# Patient Record
Sex: FEMALE | Race: WHITE | NOT HISPANIC OR LATINO
[De-identification: names, ages, dates, MRNs, and addresses within clinical notes are randomized per-mention and may not be internally consistent; named-entity substitution may affect disease eponyms.]

---

## 2019-09-20 ENCOUNTER — TRANSCRIPTION ENCOUNTER (OUTPATIENT)
Age: 24
End: 2019-09-20

## 2021-02-16 ENCOUNTER — TRANSCRIPTION ENCOUNTER (OUTPATIENT)
Age: 26
End: 2021-02-16

## 2021-03-23 ENCOUNTER — TRANSCRIPTION ENCOUNTER (OUTPATIENT)
Age: 26
End: 2021-03-23

## 2021-05-13 ENCOUNTER — RESULT REVIEW (OUTPATIENT)
Age: 26
End: 2021-05-13

## 2021-06-14 ENCOUNTER — TRANSCRIPTION ENCOUNTER (OUTPATIENT)
Age: 26
End: 2021-06-14

## 2021-06-28 ENCOUNTER — TRANSCRIPTION ENCOUNTER (OUTPATIENT)
Age: 26
End: 2021-06-28

## 2021-07-07 ENCOUNTER — APPOINTMENT (OUTPATIENT)
Dept: ORTHOPEDIC SURGERY | Facility: CLINIC | Age: 26
End: 2021-07-07
Payer: COMMERCIAL

## 2021-07-07 ENCOUNTER — NON-APPOINTMENT (OUTPATIENT)
Age: 26
End: 2021-07-07

## 2021-07-07 VITALS
SYSTOLIC BLOOD PRESSURE: 119 MMHG | BODY MASS INDEX: 34.53 KG/M2 | DIASTOLIC BLOOD PRESSURE: 83 MMHG | HEART RATE: 74 BPM | WEIGHT: 220 LBS | HEIGHT: 67 IN

## 2021-07-07 DIAGNOSIS — M54.5 LOW BACK PAIN: ICD-10-CM

## 2021-07-07 DIAGNOSIS — G89.29 LOW BACK PAIN: ICD-10-CM

## 2021-07-07 DIAGNOSIS — S73.191A OTHER SPRAIN OF RIGHT HIP, INITIAL ENCOUNTER: ICD-10-CM

## 2021-07-07 PROCEDURE — 72170 X-RAY EXAM OF PELVIS: CPT

## 2021-07-07 PROCEDURE — 99203 OFFICE O/P NEW LOW 30 MIN: CPT

## 2021-07-07 PROCEDURE — 72100 X-RAY EXAM L-S SPINE 2/3 VWS: CPT

## 2021-07-07 PROCEDURE — 99072 ADDL SUPL MATRL&STAF TM PHE: CPT

## 2021-11-30 ENCOUNTER — TRANSCRIPTION ENCOUNTER (OUTPATIENT)
Age: 26
End: 2021-11-30

## 2022-04-26 ENCOUNTER — TRANSCRIPTION ENCOUNTER (OUTPATIENT)
Age: 27
End: 2022-04-26

## 2022-06-13 ENCOUNTER — APPOINTMENT (OUTPATIENT)
Dept: ORTHOPEDIC SURGERY | Facility: CLINIC | Age: 27
End: 2022-06-13
Payer: COMMERCIAL

## 2022-06-13 VITALS — WEIGHT: 220 LBS | HEIGHT: 67 IN | BODY MASS INDEX: 34.53 KG/M2

## 2022-06-13 DIAGNOSIS — M25.362 OTHER INSTABILITY, LEFT KNEE: ICD-10-CM

## 2022-06-13 DIAGNOSIS — S83.005A UNSPECIFIED DISLOCATION OF LEFT PATELLA, INITIAL ENCOUNTER: ICD-10-CM

## 2022-06-13 DIAGNOSIS — Z78.9 OTHER SPECIFIED HEALTH STATUS: ICD-10-CM

## 2022-06-13 PROCEDURE — 73564 X-RAY EXAM KNEE 4 OR MORE: CPT | Mod: LT

## 2022-06-13 PROCEDURE — 99204 OFFICE O/P NEW MOD 45 MIN: CPT

## 2022-06-13 NOTE — HISTORY OF PRESENT ILLNESS
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  felt patella dislocate.  pain located at the anteiror and medial patellar area with associated 'pop' at time of injury and sense of instability at patella .  worse with stairs and better at rest.  has tried the following: Advil and Tylenol and icing\par \par H/O labral repair left hip 5/3/2022 Dr. Toribio\par

## 2022-06-13 NOTE — IMAGING
[de-identified] :  LEFT KNEE\par Inspection:  moderate effusion\par Palpation: med facet of patella tenderness, medial retinacular tenderness\par Knee Range of Motion:  5-120\par Strength: 5/5 Quadriceps strength, 5/5 Hamstring strength\par Neurological: light touch is intact throughout\par Ligament Stability and Special Tests: \par McMurrays: neg\par Lachman: neg\par Pivot Shift: neg\par Posterior Drawer: neg\par Valgus: neg\par Varus: neg\par Patella Apprehension: positive\par Patella Maltracking: positive\par

## 2022-06-13 NOTE — ASSESSMENT
[FreeTextEntry1] :  Left X-Ray Examination of the KNEE (4 views): there are no fractures, subluxations or dislocations.\par \par Due to the patients instability event and mechanical symptoms along with pain, effusion, and pos patella apprehension and maltracking on exam we will get an mri to eval for loose bodies or chondral defects\par \par - The patient was advised to apply ice (wrapped in a towel or protective covering) to the area daily (20 minutes at a time, 2-4X/day).\par - PT for quad/VMO strengthening and progress to dynamic core and glut exercises\par - The patient was advised to modify their activities.\par - Patella stablization brace \par - f/u after mri\par \par \par Medication Discussion:\par 1) We discussed a comprehensive treatment plan that included possible pharmaceutical management involving the use of prescription strength medications including but not limited to options such as oral Naprosyn 500mg BID, once daily Meloxicam 15 mg, or 500-650 mg Tylenol versus over the counter oral medications in addition to discussing possible topical prescription Pennsaid vs  Voltaren gel.\par 2) There is a moderate risk of morbidity with further treatment, especially from use of prescription strength medications and possible side effects of these medications which include but are not limited to upset stomach with oral medications, skin reactions to topical medications and GI/cardiac/renal issues with long term use.\par 3) I recommended that the patient follow-up with their medical physician to discuss any significant specific potential issues with long term medication use such as interactions with current medications or with exacerbation of underlying medical comorbidities.\par 4) The benefits and risks associated with use of oral and / or topical prescription and over the counter anti-inflammatory medications were discussed with the patient. The patient voiced understanding of the risks including but not limited to bleeding, stroke, kidney dysfunction, heart disease, and were referred to the black box warning label for further information.\par \par

## 2022-06-18 ENCOUNTER — APPOINTMENT (OUTPATIENT)
Dept: MRI IMAGING | Facility: CLINIC | Age: 27
End: 2022-06-18

## 2022-06-23 ENCOUNTER — APPOINTMENT (OUTPATIENT)
Dept: ORTHOPEDIC SURGERY | Facility: CLINIC | Age: 27
End: 2022-06-23

## 2022-07-27 ENCOUNTER — NON-APPOINTMENT (OUTPATIENT)
Age: 27
End: 2022-07-27

## 2022-08-21 ENCOUNTER — NON-APPOINTMENT (OUTPATIENT)
Age: 27
End: 2022-08-21

## 2022-09-15 ENCOUNTER — NON-APPOINTMENT (OUTPATIENT)
Age: 27
End: 2022-09-15

## 2022-09-20 ENCOUNTER — APPOINTMENT (OUTPATIENT)
Dept: ORTHOPEDIC SURGERY | Facility: CLINIC | Age: 27
End: 2022-09-20

## 2022-09-28 ENCOUNTER — NON-APPOINTMENT (OUTPATIENT)
Age: 27
End: 2022-09-28

## 2023-01-03 ENCOUNTER — NON-APPOINTMENT (OUTPATIENT)
Age: 28
End: 2023-01-03

## 2023-01-23 ENCOUNTER — NON-APPOINTMENT (OUTPATIENT)
Age: 28
End: 2023-01-23

## 2023-05-02 ENCOUNTER — NON-APPOINTMENT (OUTPATIENT)
Age: 28
End: 2023-05-02

## 2023-07-13 ENCOUNTER — NON-APPOINTMENT (OUTPATIENT)
Age: 28
End: 2023-07-13

## 2023-07-23 ENCOUNTER — NON-APPOINTMENT (OUTPATIENT)
Age: 28
End: 2023-07-23

## 2023-10-31 ENCOUNTER — NON-APPOINTMENT (OUTPATIENT)
Age: 28
End: 2023-10-31

## 2023-11-20 ENCOUNTER — NON-APPOINTMENT (OUTPATIENT)
Age: 28
End: 2023-11-20

## 2024-01-22 ENCOUNTER — APPOINTMENT (OUTPATIENT)
Dept: ANTEPARTUM | Facility: CLINIC | Age: 29
End: 2024-01-22
Payer: COMMERCIAL

## 2024-01-22 ENCOUNTER — ASOB RESULT (OUTPATIENT)
Age: 29
End: 2024-01-22

## 2024-01-22 PROCEDURE — 76813 OB US NUCHAL MEAS 1 GEST: CPT

## 2024-01-22 PROCEDURE — 76801 OB US < 14 WKS SINGLE FETUS: CPT | Mod: 59

## 2024-02-09 ENCOUNTER — NON-APPOINTMENT (OUTPATIENT)
Age: 29
End: 2024-02-09

## 2024-03-18 ENCOUNTER — ASOB RESULT (OUTPATIENT)
Age: 29
End: 2024-03-18

## 2024-03-18 ENCOUNTER — APPOINTMENT (OUTPATIENT)
Dept: ANTEPARTUM | Facility: CLINIC | Age: 29
End: 2024-03-18
Payer: COMMERCIAL

## 2024-03-18 PROCEDURE — 76811 OB US DETAILED SNGL FETUS: CPT

## 2024-03-25 ENCOUNTER — APPOINTMENT (OUTPATIENT)
Dept: ANTEPARTUM | Facility: CLINIC | Age: 29
End: 2024-03-25

## 2024-03-25 ENCOUNTER — OUTPATIENT (OUTPATIENT)
Dept: OUTPATIENT SERVICES | Facility: HOSPITAL | Age: 29
LOS: 1 days | Discharge: HOME | End: 2024-03-25
Payer: COMMERCIAL

## 2024-03-25 VITALS
HEART RATE: 102 BPM | DIASTOLIC BLOOD PRESSURE: 74 MMHG | SYSTOLIC BLOOD PRESSURE: 126 MMHG | TEMPERATURE: 98 F | RESPIRATION RATE: 16 BRPM

## 2024-03-25 VITALS — SYSTOLIC BLOOD PRESSURE: 106 MMHG | HEART RATE: 85 BPM | DIASTOLIC BLOOD PRESSURE: 59 MMHG

## 2024-03-25 DIAGNOSIS — O26.899 OTHER SPECIFIED PREGNANCY RELATED CONDITIONS, UNSPECIFIED TRIMESTER: ICD-10-CM

## 2024-03-25 DIAGNOSIS — Z98.890 OTHER SPECIFIED POSTPROCEDURAL STATES: Chronic | ICD-10-CM

## 2024-03-25 LAB
ALBUMIN SERPL ELPH-MCNC: 3.7 G/DL — SIGNIFICANT CHANGE UP (ref 3.3–5)
ALP SERPL-CCNC: 79 U/L — SIGNIFICANT CHANGE UP (ref 40–120)
ALT FLD-CCNC: 22 U/L — SIGNIFICANT CHANGE UP (ref 4–33)
ANION GAP SERPL CALC-SCNC: 9 MMOL/L — SIGNIFICANT CHANGE UP (ref 7–14)
APPEARANCE UR: ABNORMAL
APTT BLD: 30.8 SEC — SIGNIFICANT CHANGE UP (ref 24.5–35.6)
AST SERPL-CCNC: 18 U/L — SIGNIFICANT CHANGE UP (ref 4–32)
BACTERIA # UR AUTO: ABNORMAL /HPF
BASOPHILS # BLD AUTO: 0.03 K/UL — SIGNIFICANT CHANGE UP (ref 0–0.2)
BASOPHILS NFR BLD AUTO: 0.3 % — SIGNIFICANT CHANGE UP (ref 0–2)
BILIRUB SERPL-MCNC: <0.2 MG/DL — SIGNIFICANT CHANGE UP (ref 0.2–1.2)
BILIRUB UR-MCNC: NEGATIVE — SIGNIFICANT CHANGE UP
BUN SERPL-MCNC: 6 MG/DL — LOW (ref 7–23)
CALCIUM SERPL-MCNC: 8.9 MG/DL — SIGNIFICANT CHANGE UP (ref 8.4–10.5)
CAST: 0 /LPF — SIGNIFICANT CHANGE UP (ref 0–4)
CHLORIDE SERPL-SCNC: 104 MMOL/L — SIGNIFICANT CHANGE UP (ref 98–107)
CO2 SERPL-SCNC: 24 MMOL/L — SIGNIFICANT CHANGE UP (ref 22–31)
COLOR SPEC: YELLOW — SIGNIFICANT CHANGE UP
CREAT ?TM UR-MCNC: 142 MG/DL — SIGNIFICANT CHANGE UP
CREAT SERPL-MCNC: 0.55 MG/DL — SIGNIFICANT CHANGE UP (ref 0.5–1.3)
DIFF PNL FLD: NEGATIVE — SIGNIFICANT CHANGE UP
EGFR: 127 ML/MIN/1.73M2 — SIGNIFICANT CHANGE UP
EOSINOPHIL # BLD AUTO: 0.16 K/UL — SIGNIFICANT CHANGE UP (ref 0–0.5)
EOSINOPHIL NFR BLD AUTO: 1.4 % — SIGNIFICANT CHANGE UP (ref 0–6)
FIBRINOGEN PPP-MCNC: 624 MG/DL — HIGH (ref 200–465)
GLUCOSE SERPL-MCNC: 82 MG/DL — SIGNIFICANT CHANGE UP (ref 70–99)
GLUCOSE UR QL: NEGATIVE MG/DL — SIGNIFICANT CHANGE UP
HCT VFR BLD CALC: 34.7 % — SIGNIFICANT CHANGE UP (ref 34.5–45)
HGB BLD-MCNC: 11.3 G/DL — LOW (ref 11.5–15.5)
IANC: 9.37 K/UL — HIGH (ref 1.8–7.4)
IMM GRANULOCYTES NFR BLD AUTO: 1 % — HIGH (ref 0–0.9)
INR BLD: <0.9 RATIO — SIGNIFICANT CHANGE UP (ref 0.85–1.18)
KETONES UR-MCNC: NEGATIVE MG/DL — SIGNIFICANT CHANGE UP
LDH SERPL L TO P-CCNC: 149 U/L — SIGNIFICANT CHANGE UP (ref 135–225)
LEUKOCYTE ESTERASE UR-ACNC: NEGATIVE — SIGNIFICANT CHANGE UP
LYMPHOCYTES # BLD AUTO: 1.27 K/UL — SIGNIFICANT CHANGE UP (ref 1–3.3)
LYMPHOCYTES # BLD AUTO: 11 % — LOW (ref 13–44)
MCHC RBC-ENTMCNC: 29.5 PG — SIGNIFICANT CHANGE UP (ref 27–34)
MCHC RBC-ENTMCNC: 32.6 GM/DL — SIGNIFICANT CHANGE UP (ref 32–36)
MCV RBC AUTO: 90.6 FL — SIGNIFICANT CHANGE UP (ref 80–100)
MONOCYTES # BLD AUTO: 0.61 K/UL — SIGNIFICANT CHANGE UP (ref 0–0.9)
MONOCYTES NFR BLD AUTO: 5.3 % — SIGNIFICANT CHANGE UP (ref 2–14)
NEUTROPHILS # BLD AUTO: 9.37 K/UL — HIGH (ref 1.8–7.4)
NEUTROPHILS NFR BLD AUTO: 81 % — HIGH (ref 43–77)
NITRITE UR-MCNC: NEGATIVE — SIGNIFICANT CHANGE UP
NRBC # BLD: 0 /100 WBCS — SIGNIFICANT CHANGE UP (ref 0–0)
NRBC # FLD: 0 K/UL — SIGNIFICANT CHANGE UP (ref 0–0)
PH UR: 7 — SIGNIFICANT CHANGE UP (ref 5–8)
PLATELET # BLD AUTO: 326 K/UL — SIGNIFICANT CHANGE UP (ref 150–400)
POTASSIUM SERPL-MCNC: 3.9 MMOL/L — SIGNIFICANT CHANGE UP (ref 3.5–5.3)
POTASSIUM SERPL-SCNC: 3.9 MMOL/L — SIGNIFICANT CHANGE UP (ref 3.5–5.3)
PROT ?TM UR-MCNC: 14 MG/DL — SIGNIFICANT CHANGE UP
PROT SERPL-MCNC: 6.3 G/DL — SIGNIFICANT CHANGE UP (ref 6–8.3)
PROT UR-MCNC: NEGATIVE MG/DL — SIGNIFICANT CHANGE UP
PROT/CREAT UR-RTO: 0.1 RATIO — SIGNIFICANT CHANGE UP (ref 0–0.2)
PROTHROM AB SERPL-ACNC: 9.8 SEC — SIGNIFICANT CHANGE UP (ref 9.5–13)
RBC # BLD: 3.83 M/UL — SIGNIFICANT CHANGE UP (ref 3.8–5.2)
RBC # FLD: 14.9 % — HIGH (ref 10.3–14.5)
RBC CASTS # UR COMP ASSIST: 2 /HPF — SIGNIFICANT CHANGE UP (ref 0–4)
SODIUM SERPL-SCNC: 137 MMOL/L — SIGNIFICANT CHANGE UP (ref 135–145)
SP GR SPEC: 1.02 — SIGNIFICANT CHANGE UP (ref 1–1.03)
SQUAMOUS # UR AUTO: 6 /HPF — HIGH (ref 0–5)
URATE SERPL-MCNC: 3.4 MG/DL — SIGNIFICANT CHANGE UP (ref 2.5–7)
UROBILINOGEN FLD QL: 0.2 MG/DL — SIGNIFICANT CHANGE UP (ref 0.2–1)
WBC # BLD: 11.55 K/UL — HIGH (ref 3.8–10.5)
WBC # FLD AUTO: 11.55 K/UL — HIGH (ref 3.8–10.5)
WBC UR QL: 6 /HPF — HIGH (ref 0–5)

## 2024-03-25 PROCEDURE — 99212 OFFICE O/P EST SF 10 MIN: CPT

## 2024-03-25 NOTE — OB PROVIDER TRIAGE NOTE - NSOBPROVIDERNOTE_OBGYN_ALL_OB_FT
Dr Ash patient is a 29y/o EDC 2024  EGA 21   encounter for elevated bp.  - FHR check  -Hellp labs  - limited ultrasound Dr Ash patient is a 29y/o EDC 2024  EGA 21   encounter for elevated bp.  - FHR check  -Hellp labs  - limited ultrasound    1338  - HELLP labs negative, PC ratio 0.1.    1345    Patient states she feels better but still the headache, refuses tylenol at this time.  - Discussed with Dr Ash  - Patient encouraged to take tylenol if headache persist.  - Preeclampsia precautions given.  - Pt to f/u in one week with Dr Ash.  -All questions and concerns addressed. Pt verbalized understanding.

## 2024-03-25 NOTE — OB PROVIDER TRIAGE NOTE - HISTORY OF PRESENT ILLNESS
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 Patient c/o also seeing floaters yesterday. Patient at time of evaluation headache 2/10 on numerical scale which is temporal and mild. Patient denies nausea, vomiting, scotoma, pain under the right breast and epigastric pain. Patient reports fetal movement. Patient denies loss of fluid, vaginal bleeding and cramp and/or contractions.    Antepartum History:  -[facility] ultrasound (date): cephalic presentation, anterior placenta, HARIS: largest pocket 4.4  -ASA daily use

## 2024-03-25 NOTE — OB PROVIDER TRIAGE NOTE - NSHPLABSRESULTS_GEN_ALL_CORE
11.3   11.55 )-----------( 326      ( 25 Mar 2024 12:42 )             34.7   Urinalysis Basic - ( 25 Mar 2024 12:58 )    Color: Yellow / Appearance: Cloudy / S.018 / pH: x  Gluc: x / Ketone: Negative mg/dL  / Bili: Negative / Urobili: 0.2 mg/dL   Blood: x / Protein: Negative mg/dL / Nitrite: Negative   Leuk Esterase: Negative / RBC: 2 /HPF / WBC 6 /HPF   Sq Epi: x / Non Sq Epi: 6 /HPF / Bacteria: Few /HPF    PC ratio 0.1  Fibrinogen: 624

## 2024-03-25 NOTE — OB PROVIDER TRIAGE NOTE - NSHPPHYSICALEXAM_GEN_ALL_CORE
Neuro: No facial asymmetry, no slurred speech, moves all 4 extremities.  Mood: Alert and oriented x4, appropriate mood and affect.  Head: Normocephalic. Atraumatic.  Eyes: No discharge, lids and conjunctiva are normal.  Lungs: No respiratory distress, lung sounds are clear bilaterally.  Abdomen: Soft, nontender. Gravid. No contractions on palpation.  Lower extremities: DTR +2 bilaterally.  TAUS: presentation, placenta, HARIS, EFW, BPP. Sono saved in ASOB.  FHR: via ultrasound Neuro: No facial asymmetry, no slurred speech, moves all 4 extremities.  Mood: Alert and oriented x4, appropriate mood and affect.  Head: Normocephalic. Atraumatic.  Eyes: No discharge, lids and conjunctiva are normal.  Lungs: No respiratory distress, lung sounds are clear bilaterally.  Abdomen: Soft, nontender. Gravid. No contractions on palpation.  Lower extremities: DTR +2 bilaterally.  TAUS: anterior placenta, MVP- 4.3. Sono saved in ASOB.  FHR: 138 BPM via ultrasound

## 2024-03-25 NOTE — OB PROVIDER TRIAGE NOTE - ADDITIONAL INSTRUCTIONS
Patient states she feels better but still has a headache, refuses tylenol at this time.  - Discussed with Dr Ash  - Patient encouraged to take tylenol if headache persist.  - Preeclampsia precautions given.  - Pt to f/u in one week with Dr Ash.  -All questions and concerns addressed. Pt verbalized understanding.

## 2024-03-27 DIAGNOSIS — O26.892 OTHER SPECIFIED PREGNANCY RELATED CONDITIONS, SECOND TRIMESTER: ICD-10-CM

## 2024-03-27 DIAGNOSIS — R03.0 ELEVATED BLOOD-PRESSURE READING, WITHOUT DIAGNOSIS OF HYPERTENSION: ICD-10-CM

## 2024-03-27 DIAGNOSIS — Z3A.21 21 WEEKS GESTATION OF PREGNANCY: ICD-10-CM

## 2024-04-29 ENCOUNTER — NON-APPOINTMENT (OUTPATIENT)
Age: 29
End: 2024-04-29

## 2024-04-30 ENCOUNTER — NON-APPOINTMENT (OUTPATIENT)
Age: 29
End: 2024-04-30

## 2024-05-06 ENCOUNTER — ASOB RESULT (OUTPATIENT)
Age: 29
End: 2024-05-06

## 2024-05-06 ENCOUNTER — APPOINTMENT (OUTPATIENT)
Dept: MATERNAL FETAL MEDICINE | Facility: CLINIC | Age: 29
End: 2024-05-06
Payer: COMMERCIAL

## 2024-05-06 DIAGNOSIS — O24.419 GESTATIONAL DIABETES MELLITUS IN PREGNANCY, UNSPECIFIED CONTROL: ICD-10-CM

## 2024-05-06 PROCEDURE — G0108 DIAB MANAGE TRN  PER INDIV: CPT | Mod: 95

## 2024-05-06 RX ORDER — LANCETS 33 GAUGE
EACH MISCELLANEOUS
Qty: 4 | Refills: 0 | Status: ACTIVE | COMMUNITY
Start: 2024-05-06 | End: 1900-01-01

## 2024-05-06 RX ORDER — BLOOD-GLUCOSE METER
W/DEVICE KIT MISCELLANEOUS
Qty: 1 | Refills: 0 | Status: ACTIVE | COMMUNITY
Start: 2024-05-06 | End: 1900-01-01

## 2024-05-06 RX ORDER — URINE ACETONE TEST STRIPS
STRIP MISCELLANEOUS
Qty: 1 | Refills: 0 | Status: ACTIVE | COMMUNITY
Start: 2024-05-06 | End: 1900-01-01

## 2024-05-06 RX ORDER — BLOOD-GLUCOSE METER
KIT MISCELLANEOUS 4 TIMES DAILY
Qty: 4 | Refills: 2 | Status: ACTIVE | COMMUNITY
Start: 2024-05-06 | End: 1900-01-01

## 2024-05-13 ENCOUNTER — APPOINTMENT (OUTPATIENT)
Dept: ANTEPARTUM | Facility: CLINIC | Age: 29
End: 2024-05-13

## 2024-05-13 ENCOUNTER — ASOB RESULT (OUTPATIENT)
Age: 29
End: 2024-05-13

## 2024-05-13 ENCOUNTER — APPOINTMENT (OUTPATIENT)
Dept: ANTEPARTUM | Facility: CLINIC | Age: 29
End: 2024-05-13
Payer: COMMERCIAL

## 2024-05-13 PROCEDURE — 99213 OFFICE O/P EST LOW 20 MIN: CPT | Mod: 25

## 2024-05-13 PROCEDURE — 76816 OB US FOLLOW-UP PER FETUS: CPT

## 2024-05-13 PROCEDURE — 76819 FETAL BIOPHYS PROFIL W/O NST: CPT | Mod: 59

## 2024-05-16 ENCOUNTER — APPOINTMENT (OUTPATIENT)
Dept: MATERNAL FETAL MEDICINE | Facility: CLINIC | Age: 29
End: 2024-05-16
Payer: COMMERCIAL

## 2024-05-16 ENCOUNTER — ASOB RESULT (OUTPATIENT)
Age: 29
End: 2024-05-16

## 2024-05-16 DIAGNOSIS — O24.419 GESTATIONAL DIABETES MELLITUS IN PREGNANCY, UNSPECIFIED CONTROL: ICD-10-CM

## 2024-05-16 PROCEDURE — G0108 DIAB MANAGE TRN  PER INDIV: CPT | Mod: 95

## 2024-05-16 RX ORDER — PEN NEEDLE, DIABETIC 32GX 5/32"
32G X 4 MM NEEDLE, DISPOSABLE MISCELLANEOUS
Qty: 2 | Refills: 1 | Status: ACTIVE | COMMUNITY
Start: 2024-05-16 | End: 1900-01-01

## 2024-05-16 RX ORDER — ISOPROPYL ALCOHOL 0.7 ML/ML
SWAB TOPICAL
Qty: 1 | Refills: 2 | Status: ACTIVE | COMMUNITY
Start: 2024-05-16 | End: 1900-01-01

## 2024-05-16 RX ORDER — INSULIN HUMAN 100 [IU]/ML
100 INJECTION, SUSPENSION SUBCUTANEOUS
Qty: 1 | Refills: 1 | Status: ACTIVE | COMMUNITY
Start: 2024-05-16 | End: 1900-01-01

## 2024-05-23 ENCOUNTER — ASOB RESULT (OUTPATIENT)
Age: 29
End: 2024-05-23

## 2024-05-23 ENCOUNTER — APPOINTMENT (OUTPATIENT)
Dept: MATERNAL FETAL MEDICINE | Facility: CLINIC | Age: 29
End: 2024-05-23
Payer: COMMERCIAL

## 2024-05-23 PROCEDURE — G0108 DIAB MANAGE TRN  PER INDIV: CPT | Mod: 95

## 2024-05-23 RX ORDER — BLOOD-GLUCOSE SENSOR
EACH MISCELLANEOUS
Qty: 3 | Refills: 3 | Status: ACTIVE | COMMUNITY
Start: 2024-05-06 | End: 1900-01-01

## 2024-05-23 RX ORDER — BLOOD-GLUCOSE,RECEIVER,CONT
EACH MISCELLANEOUS
Qty: 1 | Refills: 0 | Status: ACTIVE | COMMUNITY
Start: 2024-05-06 | End: 1900-01-01

## 2024-05-30 ENCOUNTER — APPOINTMENT (OUTPATIENT)
Dept: MATERNAL FETAL MEDICINE | Facility: CLINIC | Age: 29
End: 2024-05-30

## 2024-06-06 ENCOUNTER — ASOB RESULT (OUTPATIENT)
Age: 29
End: 2024-06-06

## 2024-06-06 ENCOUNTER — APPOINTMENT (OUTPATIENT)
Dept: MATERNAL FETAL MEDICINE | Facility: CLINIC | Age: 29
End: 2024-06-06
Payer: COMMERCIAL

## 2024-06-06 PROCEDURE — G0108 DIAB MANAGE TRN  PER INDIV: CPT | Mod: 95

## 2024-06-08 ENCOUNTER — NON-APPOINTMENT (OUTPATIENT)
Age: 29
End: 2024-06-08

## 2024-06-10 ENCOUNTER — APPOINTMENT (OUTPATIENT)
Dept: ANTEPARTUM | Facility: CLINIC | Age: 29
End: 2024-06-10
Payer: COMMERCIAL

## 2024-06-10 ENCOUNTER — ASOB RESULT (OUTPATIENT)
Age: 29
End: 2024-06-10

## 2024-06-10 PROCEDURE — 76816 OB US FOLLOW-UP PER FETUS: CPT

## 2024-06-10 PROCEDURE — 76819 FETAL BIOPHYS PROFIL W/O NST: CPT | Mod: 59

## 2024-06-11 PROBLEM — Z78.9 OTHER SPECIFIED HEALTH STATUS: Chronic | Status: ACTIVE | Noted: 2024-03-25

## 2024-06-25 ENCOUNTER — OUTPATIENT (OUTPATIENT)
Dept: INPATIENT UNIT | Facility: HOSPITAL | Age: 29
LOS: 1 days | Discharge: HOME | End: 2024-06-25

## 2024-06-25 ENCOUNTER — APPOINTMENT (OUTPATIENT)
Dept: ANTEPARTUM | Facility: CLINIC | Age: 29
End: 2024-06-25

## 2024-06-25 VITALS
TEMPERATURE: 98 F | SYSTOLIC BLOOD PRESSURE: 118 MMHG | RESPIRATION RATE: 16 BRPM | HEART RATE: 84 BPM | DIASTOLIC BLOOD PRESSURE: 69 MMHG

## 2024-06-25 DIAGNOSIS — Z98.890 OTHER SPECIFIED POSTPROCEDURAL STATES: Chronic | ICD-10-CM

## 2024-06-25 DIAGNOSIS — O26.899 OTHER SPECIFIED PREGNANCY RELATED CONDITIONS, UNSPECIFIED TRIMESTER: ICD-10-CM

## 2024-06-25 LAB — GLUCOSE BLDC GLUCOMTR-MCNC: 74 MG/DL — SIGNIFICANT CHANGE UP (ref 70–99)

## 2024-06-25 RX ORDER — DEXTROSE MONOHYDRATE AND SODIUM CHLORIDE 5; .3 G/100ML; G/100ML
1000 INJECTION, SOLUTION INTRAVENOUS
Refills: 0 | Status: DISCONTINUED | OUTPATIENT
Start: 2024-06-25 | End: 2024-07-10

## 2024-06-25 RX ORDER — DEXTROSE MONOHYDRATE AND SODIUM CHLORIDE 5; .3 G/100ML; G/100ML
1000 INJECTION, SOLUTION INTRAVENOUS ONCE
Refills: 0 | Status: COMPLETED | OUTPATIENT
Start: 2024-06-25 | End: 2024-06-25

## 2024-06-25 RX ADMIN — DEXTROSE MONOHYDRATE AND SODIUM CHLORIDE 1000 MILLILITER(S): 5; .3 INJECTION, SOLUTION INTRAVENOUS at 23:14

## 2024-06-26 VITALS
RESPIRATION RATE: 16 BRPM | DIASTOLIC BLOOD PRESSURE: 70 MMHG | HEART RATE: 81 BPM | SYSTOLIC BLOOD PRESSURE: 126 MMHG | TEMPERATURE: 98 F

## 2024-06-26 LAB
APPEARANCE UR: CLEAR — SIGNIFICANT CHANGE UP
BILIRUB UR-MCNC: NEGATIVE — SIGNIFICANT CHANGE UP
COLOR SPEC: YELLOW — SIGNIFICANT CHANGE UP
DIFF PNL FLD: NEGATIVE — SIGNIFICANT CHANGE UP
GLUCOSE UR QL: NEGATIVE MG/DL — SIGNIFICANT CHANGE UP
KETONES UR-MCNC: 40 MG/DL
LEUKOCYTE ESTERASE UR-ACNC: NEGATIVE — SIGNIFICANT CHANGE UP
NITRITE UR-MCNC: NEGATIVE — SIGNIFICANT CHANGE UP
PH UR: 7 — SIGNIFICANT CHANGE UP (ref 5–8)
PROT UR-MCNC: NEGATIVE MG/DL — SIGNIFICANT CHANGE UP
SP GR SPEC: 1.01 — SIGNIFICANT CHANGE UP (ref 1–1.03)
UROBILINOGEN FLD QL: 0.2 MG/DL — SIGNIFICANT CHANGE UP (ref 0.2–1)

## 2024-06-27 ENCOUNTER — APPOINTMENT (OUTPATIENT)
Dept: MATERNAL FETAL MEDICINE | Facility: CLINIC | Age: 29
End: 2024-06-27
Payer: COMMERCIAL

## 2024-06-27 ENCOUNTER — ASOB RESULT (OUTPATIENT)
Age: 29
End: 2024-06-27

## 2024-06-27 DIAGNOSIS — O24.415 GESTATIONAL DIABETES MELLITUS IN PREGNANCY, CONTROLLED BY ORAL HYPOGLYCEMIC DRUGS: ICD-10-CM

## 2024-06-27 DIAGNOSIS — Z3A.35 35 WEEKS GESTATION OF PREGNANCY: ICD-10-CM

## 2024-06-27 DIAGNOSIS — O47.03 FALSE LABOR BEFORE 37 COMPLETED WEEKS OF GESTATION, THIRD TRIMESTER: ICD-10-CM

## 2024-06-27 PROBLEM — Z78.9 OTHER SPECIFIED HEALTH STATUS: Chronic | Status: INACTIVE | Noted: 2024-03-25 | Resolved: 2024-06-25

## 2024-06-27 PROCEDURE — G0108 DIAB MANAGE TRN  PER INDIV: CPT | Mod: 95

## 2024-07-01 ENCOUNTER — NON-APPOINTMENT (OUTPATIENT)
Age: 29
End: 2024-07-01

## 2024-07-03 ENCOUNTER — APPOINTMENT (OUTPATIENT)
Dept: MATERNAL FETAL MEDICINE | Facility: CLINIC | Age: 29
End: 2024-07-03

## 2024-07-08 ENCOUNTER — APPOINTMENT (OUTPATIENT)
Dept: ANTEPARTUM | Facility: CLINIC | Age: 29
End: 2024-07-08

## 2024-07-08 ENCOUNTER — ASOB RESULT (OUTPATIENT)
Age: 29
End: 2024-07-08

## 2024-07-08 ENCOUNTER — APPOINTMENT (OUTPATIENT)
Dept: ANTEPARTUM | Facility: CLINIC | Age: 29
End: 2024-07-08
Payer: COMMERCIAL

## 2024-07-08 PROBLEM — O24.419 GESTATIONAL DIABETES MELLITUS IN PREGNANCY, UNSPECIFIED CONTROL: Chronic | Status: ACTIVE | Noted: 2024-06-25

## 2024-07-08 PROCEDURE — 76818 FETAL BIOPHYS PROFILE W/NST: CPT | Mod: 59

## 2024-07-08 PROCEDURE — 76816 OB US FOLLOW-UP PER FETUS: CPT

## 2024-07-15 ENCOUNTER — APPOINTMENT (OUTPATIENT)
Dept: ANTEPARTUM | Facility: CLINIC | Age: 29
End: 2024-07-15
Payer: COMMERCIAL

## 2024-07-15 ENCOUNTER — ASOB RESULT (OUTPATIENT)
Age: 29
End: 2024-07-15

## 2024-07-15 PROCEDURE — 76818 FETAL BIOPHYS PROFILE W/NST: CPT

## 2024-07-22 ENCOUNTER — ASOB RESULT (OUTPATIENT)
Age: 29
End: 2024-07-22

## 2024-07-22 ENCOUNTER — APPOINTMENT (OUTPATIENT)
Dept: ANTEPARTUM | Facility: CLINIC | Age: 29
End: 2024-07-22
Payer: COMMERCIAL

## 2024-07-22 PROCEDURE — 76819 FETAL BIOPHYS PROFIL W/O NST: CPT

## 2024-07-28 ENCOUNTER — INPATIENT (INPATIENT)
Facility: HOSPITAL | Age: 29
LOS: 4 days | Discharge: HOME | End: 2024-08-02
Attending: STUDENT IN AN ORGANIZED HEALTH CARE EDUCATION/TRAINING PROGRAM | Admitting: STUDENT IN AN ORGANIZED HEALTH CARE EDUCATION/TRAINING PROGRAM
Payer: COMMERCIAL

## 2024-07-28 VITALS
DIASTOLIC BLOOD PRESSURE: 78 MMHG | TEMPERATURE: 98 F | SYSTOLIC BLOOD PRESSURE: 126 MMHG | RESPIRATION RATE: 18 BRPM | HEART RATE: 86 BPM

## 2024-07-28 DIAGNOSIS — O24.419 GESTATIONAL DIABETES MELLITUS IN PREGNANCY, UNSPECIFIED CONTROL: ICD-10-CM

## 2024-07-28 DIAGNOSIS — Z98.890 OTHER SPECIFIED POSTPROCEDURAL STATES: Chronic | ICD-10-CM

## 2024-07-28 LAB
BASOPHILS # BLD AUTO: 0.02 K/UL — SIGNIFICANT CHANGE UP (ref 0–0.2)
BASOPHILS NFR BLD AUTO: 0.2 % — SIGNIFICANT CHANGE UP (ref 0–2)
BLD GP AB SCN SERPL QL: POSITIVE — SIGNIFICANT CHANGE UP
EOSINOPHIL # BLD AUTO: 0.04 K/UL — SIGNIFICANT CHANGE UP (ref 0–0.5)
EOSINOPHIL NFR BLD AUTO: 0.4 % — SIGNIFICANT CHANGE UP (ref 0–6)
GLUCOSE BLDC GLUCOMTR-MCNC: 85 MG/DL — SIGNIFICANT CHANGE UP (ref 70–99)
HCT VFR BLD CALC: 36.5 % — SIGNIFICANT CHANGE UP (ref 34.5–45)
HGB BLD-MCNC: 12.6 G/DL — SIGNIFICANT CHANGE UP (ref 11.5–15.5)
IANC: 7.68 K/UL — HIGH (ref 1.8–7.4)
IMM GRANULOCYTES NFR BLD AUTO: 0.4 % — SIGNIFICANT CHANGE UP (ref 0–0.9)
LYMPHOCYTES # BLD AUTO: 1.21 K/UL — SIGNIFICANT CHANGE UP (ref 1–3.3)
LYMPHOCYTES # BLD AUTO: 12.6 % — LOW (ref 13–44)
MCHC RBC-ENTMCNC: 30.3 PG — SIGNIFICANT CHANGE UP (ref 27–34)
MCHC RBC-ENTMCNC: 34.5 GM/DL — SIGNIFICANT CHANGE UP (ref 32–36)
MCV RBC AUTO: 87.7 FL — SIGNIFICANT CHANGE UP (ref 80–100)
MONOCYTES # BLD AUTO: 0.58 K/UL — SIGNIFICANT CHANGE UP (ref 0–0.9)
MONOCYTES NFR BLD AUTO: 6.1 % — SIGNIFICANT CHANGE UP (ref 2–14)
NEUTROPHILS # BLD AUTO: 7.68 K/UL — HIGH (ref 1.8–7.4)
NEUTROPHILS NFR BLD AUTO: 80.3 % — HIGH (ref 43–77)
NRBC # BLD: 0 /100 WBCS — SIGNIFICANT CHANGE UP (ref 0–0)
NRBC # FLD: 0 K/UL — SIGNIFICANT CHANGE UP (ref 0–0)
PLATELET # BLD AUTO: 216 K/UL — SIGNIFICANT CHANGE UP (ref 150–400)
RBC # BLD: 4.16 M/UL — SIGNIFICANT CHANGE UP (ref 3.8–5.2)
RBC # FLD: 14.9 % — HIGH (ref 10.3–14.5)
RH IG SCN BLD-IMP: NEGATIVE — SIGNIFICANT CHANGE UP
RH IG SCN BLD-IMP: NEGATIVE — SIGNIFICANT CHANGE UP
WBC # BLD: 9.57 K/UL — SIGNIFICANT CHANGE UP (ref 3.8–10.5)
WBC # FLD AUTO: 9.57 K/UL — SIGNIFICANT CHANGE UP (ref 3.8–10.5)

## 2024-07-28 PROCEDURE — 86077 PHYS BLOOD BANK SERV XMATCH: CPT

## 2024-07-28 RX ORDER — BACTERIOSTATIC SODIUM CHLORIDE 0.9 %
1000 VIAL (ML) INJECTION
Refills: 0 | Status: DISCONTINUED | OUTPATIENT
Start: 2024-07-28 | End: 2024-07-30

## 2024-07-28 RX ORDER — TRISODIUM CITRATE DIHYDRATE AND CITRIC ACID MONOHYDRATE 500; 334 MG/5ML; MG/5ML
15 SOLUTION ORAL EVERY 6 HOURS
Refills: 0 | Status: DISCONTINUED | OUTPATIENT
Start: 2024-07-28 | End: 2024-07-30

## 2024-07-28 RX ORDER — DEXTROSE MONOHYDRATE, SODIUM CHLORIDE, SODIUM LACTATE, CALCIUM CHLORIDE, MAGNESIUM CHLORIDE 1.5; 538; 448; 18.4; 5.08 G/100ML; MG/100ML; MG/100ML; MG/100ML; MG/100ML
1000 SOLUTION INTRAPERITONEAL
Refills: 0 | Status: DISCONTINUED | OUTPATIENT
Start: 2024-07-28 | End: 2024-07-30

## 2024-07-28 RX ORDER — CHLORHEXIDINE GLUCONATE 500 MG/1
1 CLOTH TOPICAL DAILY
Refills: 0 | Status: DISCONTINUED | OUTPATIENT
Start: 2024-07-28 | End: 2024-07-30

## 2024-07-28 RX ADMIN — CHLORHEXIDINE GLUCONATE 1 APPLICATION(S): 500 CLOTH TOPICAL at 22:09

## 2024-07-28 RX ADMIN — DEXTROSE MONOHYDRATE, SODIUM CHLORIDE, SODIUM LACTATE, CALCIUM CHLORIDE, MAGNESIUM CHLORIDE 125 MILLILITER(S): 1.5; 538; 448; 18.4; 5.08 SOLUTION INTRAPERITONEAL at 22:09

## 2024-07-28 NOTE — OB RN PATIENT PROFILE - FALL HARM RISK - UNIVERSAL INTERVENTIONS
Bed in lowest position, wheels locked, appropriate side rails in place/Call bell, personal items and telephone in reach/Instruct patient to call for assistance before getting out of bed or chair/Non-slip footwear when patient is out of bed/[place] to call system/Physically safe environment - no spills, clutter or unnecessary equipment/Purposeful Proactive Rounding/Room/bathroom lighting operational, light cord in reach

## 2024-07-28 NOTE — OB PROVIDER H&P - NSHPPHYSICALEXAM_GEN_ALL_CORE
Vital Signs Last 24 Hrs  T(C): --  T(F): --  HR: --  BP: --  BP(mean): --  RR: --  SpO2: --        Physical Exam:  Gen: NAD, AxOx3  CV: RRR  Resp: CTAB  Abd: soft, NT, gravid      SVE:  FHT:  [place]:  EFW: 3700g  Sono: fetal presentation, placentation VS  T(C): --  HR: 86 (07-28-24 @ 21:16)  BP: 126/78 (07-28-24 @ 21:16)  RR: --  SpO2: --        Physical Exam:  Gen: NAD, AxOx3  CV: RRR  Resp: CTAB  Abd: soft, NT, gravid      SVE: 2/70/-3  FHT: Category 1  [place]: Irregular   EFW: 3700g  Sono: Cephalic

## 2024-07-28 NOTE — OB PROVIDER H&P - NSCORESITESY/N_GEN_A_CORE_RD
No Detail Level: Detailed Lesion Type: Abscess Method: comedo extractor Curette: No Size Of Lesion In Cm (Optional But May Be Required For Some Insurances): 0 Wound Care: Petrolatum Dressing: dry sterile dressing Epidermal Sutures: 4-0 Ethilon Epidermal Closure: simple interrupted Suture Text: The incision was partially closed with Preparation Text: The area was prepped in the usual clean fashion. Curette Text (Optional): After the contents were expressed a curette was used to partially remove the cyst wall. Consent was obtained and risks were reviewed including but not limited to delayed wound healing, infection, need for multiple I and D's, and pain. Post-Care Instructions: I reviewed with the patient in detail post-care instructions. Patient should keep wound covered and call the office should any redness, pain, swelling or worsening occur.

## 2024-07-28 NOTE — OB PROVIDER H&P - NSLOWPPHRISK_OBGYN_A_OB
No previous uterine incision/Reese Pregnancy/Less than or equal to 4 previous vaginal births/No known bleeding disorder/No history of postpartum hemorrhage/No other PPH risks indicated

## 2024-07-28 NOTE — OB PROVIDER H&P - NSSCHADMISSION_OBGYN_A_OB
----- Message from Rajni Telles RN sent at 7/19/2019  1:14 PM CDT -----  Urine culture showing E Coli, patient on Cipro which E Coli is sensitive to.   Yes

## 2024-07-28 NOTE — OB PROVIDER IHI INDUCTION/AUGMENTATION NOTE - NS_DILATION_OBGYN_ALL_OB_NU
Letter completed and given to Lorena to mail. Lorena please call Carol and let her know since she was exposed and has mild symptoms she needs to complete the 14 day quarantine at this time. If symptoms worsen and she begins having difficulty breathing then she needs to go to the ED. Otherwise, she needs to stay home.    Thanks  MB   2

## 2024-07-28 NOTE — OB PROVIDER H&P - ASSESSMENT
Pt is a 30 yo  @39.5wks, presenting as a scheduled induction of labor secondary to GDM A2. NPH 20 units a qHS.          A/P: Pt is a 30 yo  @39.5wks, presenting as a scheduled induction of labor secondary to GDM A2. NPH 20 units a qHS.    1. Admit to LND. Routine Labs. IVF  2. IOL  3. Fetus: Cat 1 tracing, Vertex, EFW 3700g . C/w EFM.  4. A2- Rotating fluids, FS q4 hrs  5. GBS negative   6. Pain: IV pain meds/epidural PRN      DEMETRI Chong  Discussed with Dr. Levin        Pt is a 28 yo  @39.5wks, presenting as a scheduled induction of labor secondary to GDM A2. NPH 20 units a qHS.          A/P: Pt is a 28 yo  @39.5wks, presenting as a scheduled induction of labor secondary to GDM A2. NPH 20 units a qHS.    1. Admit to LND. Routine Labs. IVF  2. IOL with buccal cytotec   3. Fetus: Cat 1 tracing, Vertex, EFW 3700g . C/w EFM.  4. A2- Rotating fluids, FS q4 hrs  5. GBS negative   6. Pain: IV pain meds/epidural PRN      Arlette NP  Discussed with Dr. Levin            A/P: Pt is a 28 yo  @39.5wks, presenting as a scheduled induction of labor secondary to GDM A2. NPH 20 units a qHS.    1. Admit to LND. Routine Labs. IVF  2. IOL with buccal cytotec   3. Fetus: Cat 1 tracing, Vertex, EFW 3700g . C/w EFM.  4. A2- Rotating fluids, FS q4 hrs  5. GBS negative   6. Pain: IV pain meds/epidural PRN      DEMETRI Chong  Discussed with Dr. Levin

## 2024-07-28 NOTE — OB PROVIDER H&P - NS_SPECEXAM_OBGYN_ALL_OB
Cj and his wife returned.  He remains quite symptomatic with bilateral lumbar radiculopathy.    Again, he has had a prior L3-4 decompression and fusion that he did well with.    However for nearly 8 months he has had progressive difficulty with prolonged standing and walking because of radiating leg pain and weakness.  Physical therapy, nonsteroidal anti-inflammatories, and injections have relieved his symptoms.    Family, social, and medical histories are obtained and reviewed.    30-point, patient-recorded Review of Systems is personally obtained and reviewed. Inclusive is no history of weight loss, change in appetite, recent change in activity level, change in bowel or bladder habits, fevers, chills, malaise, or night pain.    On exam he has a slow deliberate gait.  He has developed a partial right foot drop with weakness in ankle dorsiflexion on the right, 3/5.    We reviewed his updated MRI which does show severe stenosis at L2-3.    This man has developed persistent and severe bilateral lumbar radiculopathy, right greater than left with weakness.  Given the severity of his symptoms, the progressive nature, the presence of a neurologic deficit, and the presence of adjacent level stenosis, surgery is indicated.  Surgery would be a L2-3 decompression with extension of his fusion proximally to L2.    We talked about the risks and benefits and expectations of surgery.    He would like to proceed.    ** Dictated with voice recognition software and not immediately reviewed for errors in grammar and/or spelling **   No

## 2024-07-28 NOTE — OB PROVIDER H&P - HISTORY OF PRESENT ILLNESS
HPI: Pt is a 28 yo  @39.5wks, presenting as a scheduled induction of labor secondary to GDM A2. NPH 20 units a qHS. Patient endorses well controlled FS. Endorses positive fetal movement. Denies LOF/VB/CTX.  GBS neg  EFW: 3700g  Prenatal Issues: GDMA2  Ob: W4W      OBHx: G1 current   Gyn Hx: denies   PMH: denies  SHx: bilateral hip arthroscopy for torn labrum   Psych: denies   Social: denies   Medications: PNV, bASA, Humulin 20u qhs   Allergies: NKDA   Will Accept blood transfusion? yes              HPI: Pt is a 30 yo  @39.5wks, DAORN: 24, presenting as a scheduled induction of labor secondary to GDM A2. NPH 20 units a qHS. Patient endorses well controlled FS. Endorses positive fetal movement. Denies LOF/VB/CTX.  GBS neg  EFW: 3700g  Prenatal Issues: GDMA2  Ob: W4W      OBHx: G1 current   Gyn Hx: denies   PMH: denies  SHx: bilateral hip arthroscopy for torn labrum   Psych: denies   Social: denies   Medications: PNV, bASA, Humulin 20u qhs   Allergies: NKDA   Will Accept blood transfusion? yes

## 2024-07-28 NOTE — OB PROVIDER H&P - CURRENT PREGNANCY COMPLICATIONS, OB PROFILE
Return if symptoms worsen or fail to improve.  Call with any questions or concerns.   
GDM A2/Gestational Diabetes

## 2024-07-29 ENCOUNTER — APPOINTMENT (OUTPATIENT)
Dept: ANTEPARTUM | Facility: CLINIC | Age: 29
End: 2024-07-29

## 2024-07-29 LAB
GLUCOSE BLDC GLUCOMTR-MCNC: 84 MG/DL — SIGNIFICANT CHANGE UP (ref 70–99)
GLUCOSE BLDC GLUCOMTR-MCNC: 84 MG/DL — SIGNIFICANT CHANGE UP (ref 70–99)
GLUCOSE BLDC GLUCOMTR-MCNC: 89 MG/DL — SIGNIFICANT CHANGE UP (ref 70–99)
GLUCOSE BLDC GLUCOMTR-MCNC: 90 MG/DL — SIGNIFICANT CHANGE UP (ref 70–99)
GLUCOSE BLDC GLUCOMTR-MCNC: 94 MG/DL — SIGNIFICANT CHANGE UP (ref 70–99)
GLUCOSE BLDC GLUCOMTR-MCNC: 98 MG/DL — SIGNIFICANT CHANGE UP (ref 70–99)

## 2024-07-29 RX ORDER — ONDANSETRON HCL/PF 4 MG/2 ML
4 VIAL (ML) INJECTION ONCE
Refills: 0 | Status: COMPLETED | OUTPATIENT
Start: 2024-07-29 | End: 2024-07-29

## 2024-07-29 RX ORDER — OXYTOCIN/RINGER'S LACTATE 20/1000 ML
2 PLASTIC BAG, INJECTION (ML) INTRAVENOUS
Qty: 30 | Refills: 0 | Status: DISCONTINUED | OUTPATIENT
Start: 2024-07-29 | End: 2024-07-30

## 2024-07-29 RX ORDER — DIPHENHYDRAMINE HCL 25 MG
25 CAPSULE ORAL ONCE
Refills: 0 | Status: COMPLETED | OUTPATIENT
Start: 2024-07-29 | End: 2024-07-29

## 2024-07-29 RX ADMIN — Medication 4 MILLIGRAM(S): at 05:59

## 2024-07-29 RX ADMIN — Medication 2 MILLIUNIT(S)/MIN: at 15:43

## 2024-07-29 RX ADMIN — Medication 4 MILLIGRAM(S): at 05:41

## 2024-07-29 RX ADMIN — CHLORHEXIDINE GLUCONATE 1 APPLICATION(S): 500 CLOTH TOPICAL at 12:34

## 2024-07-29 RX ADMIN — Medication 4 MILLIGRAM(S): at 09:28

## 2024-07-29 RX ADMIN — Medication 4 MILLIGRAM(S): at 12:35

## 2024-07-29 RX ADMIN — Medication 25 MILLIGRAM(S): at 19:45

## 2024-07-29 RX ADMIN — Medication 4 MILLIGRAM(S): at 05:39

## 2024-07-29 NOTE — CHART NOTE - NSCHARTNOTEFT_GEN_A_CORE
OB Attending Note    Pt seen and evaluated at bedside.   Standing at bedside rocking with monitors on.   Reports feeling intermittent strong contractions.    SVE 3.5/70/-3  intermediate tone, anterior position     /mod amy/+accel, -decel   [place] ctx q2-4 mins     A/P P0 IOL GDMA2  -Labor: for one additional dose of buccal cytotec and then transition to pitocin augmentation   -Fetus: category 1 tracing  -Analgesia: declines epidural at this time.     CRISTIAN Ash MD

## 2024-07-29 NOTE — OB PROVIDER LABOR PROGRESS NOTE - NS_SUBJECTIVE/OBJECTIVE_OBGYN_ALL_OB_FT
R3 Labor Note    S: Patient evaluated at bedside for cervical change.     O:  T(C): 37 (07-29-24 @ 09:26), Max: 37 (07-29-24 @ 09:26)  HR: 104 (07-29-24 @ 11:55) (67 - 104)  BP: 113/69 (07-29-24 @ 11:26) (113/69 - 138/78)  RR: 18 (07-29-24 @ 06:00) (16 - 18)  SpO2: 98% (07-29-24 @ 11:55) (91% - 98%)
Patient seen and examined at bedside for late decelerations resolved with maternal repositioning.
Patient seen and examined for placement of cooks cervical balloon

## 2024-07-29 NOTE — CHART NOTE - NSCHARTNOTEFT_GEN_A_CORE
OB Attending NOte    Patient seen and evaluated at bedside.   Denies complaints.  Comfortable with epidural.     SVE 3.5/70/-3       EFM category 1   [place] ctx q2 mins    A/P P0 IOL GDMA2  -Labor: currently one pitocin at 6mu.  Was paused for a while due to patient discomfort and requesting epidural.   Will allow 1-2 hours of uninteruptted pitocin augmentation and then will re-examine and consider AROM  -FEtus: Category 1   -Analgesia: continue epidural     CRISTIAN Ash MD

## 2024-07-29 NOTE — OB PROVIDER LABOR PROGRESS NOTE - ASSESSMENT
28 yo F  at 39w6d admitted for IOL for GDMA2 w/ late decelerations.   - Continue maternal repositioning   - Continue EFM/toco   - Transfer patient to L&D     D/w Bibiana Velazco Epsinal, PGY4 
 @39.6wks A2 IOL  Cooks cervical balloon placed without incident  60cc's instilled in both uterine and vaginal balloons  Patient not tolerating well  Patient requesting balloon to come out and refusing placement at this time   Declines pain medication  Cervical balloon removed  Will continue with cytotec    dw MD Poonam Dempsey NP
A/P 29y  @39w6d IOL for A2  -IOL: s/p BC, will consider starting Pitocin after Dr. Ash sees patient  -Cat 1 tracing  -GBS neg  -EFW 3700  -A2 - NPO, c/w rotating fluids, FS per protocol  -Analgesia - not requesting   -Anticipate     d/w Dr. Mihir Marquis, PGY-3

## 2024-07-29 NOTE — CHART NOTE - NSCHARTNOTEFT_GEN_A_CORE
OB Attending Note    Patient seen and evaluated at bedside.  Denies complaints.  Comfortable with epidural.     SVE 4/70/-3     AROM performed- clear fluid     /mod amy/+accel,-decel  [place] ctx q2 mins     A/P P0 IOL GDMA2  -Labor:  sp AROM.  pitocin at 18 mu.  IUPC placed.  will titrate pitocin based on [place] units.     -Fetus:  category 1 tracing  -Analgesia: continue epidural     CRISTIAN Ash MD

## 2024-07-30 LAB
ANISOCYTOSIS BLD QL: SLIGHT — SIGNIFICANT CHANGE UP
BASOPHILS # BLD AUTO: 0 K/UL — SIGNIFICANT CHANGE UP (ref 0–0.2)
BASOPHILS NFR BLD AUTO: 0 % — SIGNIFICANT CHANGE UP (ref 0–2)
EOSINOPHIL # BLD AUTO: 0 K/UL — SIGNIFICANT CHANGE UP (ref 0–0.5)
EOSINOPHIL NFR BLD AUTO: 0 % — SIGNIFICANT CHANGE UP (ref 0–6)
GIANT PLATELETS BLD QL SMEAR: PRESENT — SIGNIFICANT CHANGE UP
HCT VFR BLD CALC: 33.4 % — LOW (ref 34.5–45)
HCT VFR BLD CALC: 38.2 % — SIGNIFICANT CHANGE UP (ref 34.5–45)
HGB BLD-MCNC: 11.1 G/DL — LOW (ref 11.5–15.5)
HGB BLD-MCNC: 11.9 G/DL — SIGNIFICANT CHANGE UP (ref 11.5–15.5)
IANC: 16.9 K/UL — HIGH (ref 1.8–7.4)
KLEIHAUER-BETKE CALCULATION: 0 % — SIGNIFICANT CHANGE UP (ref 0–0.2)
LACTATE SERPL-SCNC: 1.3 MMOL/L — SIGNIFICANT CHANGE UP (ref 0.5–2)
LYMPHOCYTES # BLD AUTO: 0.32 K/UL — LOW (ref 1–3.3)
LYMPHOCYTES # BLD AUTO: 1.7 % — LOW (ref 13–44)
MACROCYTES BLD QL: SLIGHT — SIGNIFICANT CHANGE UP
MANUAL SMEAR VERIFICATION: SIGNIFICANT CHANGE UP
MCHC RBC-ENTMCNC: 29.6 PG — SIGNIFICANT CHANGE UP (ref 27–34)
MCHC RBC-ENTMCNC: 30.2 PG — SIGNIFICANT CHANGE UP (ref 27–34)
MCHC RBC-ENTMCNC: 31.2 GM/DL — LOW (ref 32–36)
MCHC RBC-ENTMCNC: 33.2 GM/DL — SIGNIFICANT CHANGE UP (ref 32–36)
MCV RBC AUTO: 90.8 FL — SIGNIFICANT CHANGE UP (ref 80–100)
MCV RBC AUTO: 95 FL — SIGNIFICANT CHANGE UP (ref 80–100)
MONOCYTES # BLD AUTO: 0.32 K/UL — SIGNIFICANT CHANGE UP (ref 0–0.9)
MONOCYTES NFR BLD AUTO: 1.7 % — LOW (ref 2–14)
NEUTROPHILS # BLD AUTO: 17.84 K/UL — HIGH (ref 1.8–7.4)
NEUTROPHILS NFR BLD AUTO: 94.8 % — HIGH (ref 43–77)
NEUTS BAND # BLD: 0.9 % — SIGNIFICANT CHANGE UP (ref 0–6)
NRBC # BLD: 0 /100 WBCS — SIGNIFICANT CHANGE UP (ref 0–0)
NRBC # FLD: 0 K/UL — SIGNIFICANT CHANGE UP (ref 0–0)
OVALOCYTES BLD QL SMEAR: SLIGHT — SIGNIFICANT CHANGE UP
PLAT MORPH BLD: NORMAL — SIGNIFICANT CHANGE UP
PLATELET # BLD AUTO: 216 K/UL — SIGNIFICANT CHANGE UP (ref 150–400)
PLATELET # BLD AUTO: 250 K/UL — SIGNIFICANT CHANGE UP (ref 150–400)
PLATELET COUNT - ESTIMATE: NORMAL — SIGNIFICANT CHANGE UP
POIKILOCYTOSIS BLD QL AUTO: SLIGHT — SIGNIFICANT CHANGE UP
POLYCHROMASIA BLD QL SMEAR: SLIGHT — SIGNIFICANT CHANGE UP
RBC # BLD: 3.68 M/UL — LOW (ref 3.8–5.2)
RBC # BLD: 4.02 M/UL — SIGNIFICANT CHANGE UP (ref 3.8–5.2)
RBC # FLD: 15 % — HIGH (ref 10.3–14.5)
RBC # FLD: 15.5 % — HIGH (ref 10.3–14.5)
RBC BLD AUTO: ABNORMAL
T PALLIDUM AB TITR SER: NEGATIVE — SIGNIFICANT CHANGE UP
VARIANT LYMPHS # BLD: 0.9 % — SIGNIFICANT CHANGE UP (ref 0–6)
WBC # BLD: 13.89 K/UL — HIGH (ref 3.8–10.5)
WBC # BLD: 18.64 K/UL — HIGH (ref 3.8–10.5)
WBC # FLD AUTO: 13.89 K/UL — HIGH (ref 3.8–10.5)
WBC # FLD AUTO: 18.64 K/UL — HIGH (ref 3.8–10.5)

## 2024-07-30 RX ORDER — DIBUCAINE 1 %
1 OINTMENT (GRAM) TOPICAL EVERY 6 HOURS
Refills: 0 | Status: DISCONTINUED | OUTPATIENT
Start: 2024-07-30 | End: 2024-08-02

## 2024-07-30 RX ORDER — ACETAMINOPHEN 500 MG
975 TABLET ORAL ONCE
Refills: 0 | Status: DISCONTINUED | OUTPATIENT
Start: 2024-07-30 | End: 2024-08-01

## 2024-07-30 RX ORDER — KETOROLAC TROMETHAMINE 10 MG
30 TABLET ORAL ONCE
Refills: 0 | Status: DISCONTINUED | OUTPATIENT
Start: 2024-07-30 | End: 2024-07-30

## 2024-07-30 RX ORDER — IBUPROFEN 200 MG
600 TABLET ORAL EVERY 6 HOURS
Refills: 0 | Status: DISCONTINUED | OUTPATIENT
Start: 2024-07-30 | End: 2024-08-02

## 2024-07-30 RX ORDER — ONDANSETRON HCL/PF 4 MG/2 ML
4 VIAL (ML) INJECTION ONCE
Refills: 0 | Status: COMPLETED | OUTPATIENT
Start: 2024-07-30 | End: 2024-07-30

## 2024-07-30 RX ORDER — WITCH HAZEL 500 MG/1
1 CLOTH TOPICAL EVERY 4 HOURS
Refills: 0 | Status: DISCONTINUED | OUTPATIENT
Start: 2024-07-30 | End: 2024-08-02

## 2024-07-30 RX ORDER — CLOSTRIDIUM TETANI TOXOID ANTIGEN (FORMALDEHYDE INACTIVATED), CORYNEBACTERIUM DIPHTHERIAE TOXOID ANTIGEN (FORMALDEHYDE INACTIVATED), BORDETELLA PERTUSSIS TOXOID ANTIGEN (GLUTARALDEHYDE INACTIVATED), BORDETELLA PERTUSSIS FILAMENTOUS HEMAGGLUTININ ANTIGEN (FORMALDEHYDE INACTIVATED), BORDETELLA PERTUSSIS PERTACTIN ANTIGEN, AND BORDETELLA PERTUSSIS FIMBRIAE 2/3 ANTIGEN 5; 2; 2.5; 5; 3; 5 [LF]/.5ML; [LF]/.5ML; UG/.5ML; UG/.5ML; UG/.5ML; UG/.5ML
0.5 INJECTION, SUSPENSION INTRAMUSCULAR ONCE
Refills: 0 | Status: DISCONTINUED | OUTPATIENT
Start: 2024-07-30 | End: 2024-08-02

## 2024-07-30 RX ORDER — OXYCODONE HYDROCHLORIDE 30 MG/1
5 TABLET ORAL ONCE
Refills: 0 | Status: DISCONTINUED | OUTPATIENT
Start: 2024-07-30 | End: 2024-08-02

## 2024-07-30 RX ORDER — BACTERIOSTATIC SODIUM CHLORIDE 0.9 %
3 VIAL (ML) INJECTION EVERY 8 HOURS
Refills: 0 | Status: DISCONTINUED | OUTPATIENT
Start: 2024-07-30 | End: 2024-08-02

## 2024-07-30 RX ORDER — ACETAMINOPHEN 500 MG
975 TABLET ORAL
Refills: 0 | Status: DISCONTINUED | OUTPATIENT
Start: 2024-07-30 | End: 2024-07-31

## 2024-07-30 RX ORDER — HYDROCORTISONE 1 %
1 CREAM (GRAM) TOPICAL EVERY 6 HOURS
Refills: 0 | Status: DISCONTINUED | OUTPATIENT
Start: 2024-07-30 | End: 2024-08-02

## 2024-07-30 RX ORDER — LANOLIN 100 %
1 OINTMENT (GRAM) TOPICAL EVERY 6 HOURS
Refills: 0 | Status: DISCONTINUED | OUTPATIENT
Start: 2024-07-30 | End: 2024-08-02

## 2024-07-30 RX ORDER — MAGNESIUM HYDROXIDE 400 MG/5ML
30 SUSPENSION, ORAL (FINAL DOSE FORM) ORAL
Refills: 0 | Status: DISCONTINUED | OUTPATIENT
Start: 2024-07-30 | End: 2024-08-02

## 2024-07-30 RX ORDER — DIPHENOXYLATE HCL/ATROPINE 2.5-.025MG
1 TABLET ORAL ONCE
Refills: 0 | Status: DISCONTINUED | OUTPATIENT
Start: 2024-07-30 | End: 2024-07-30

## 2024-07-30 RX ORDER — ACETAMINOPHEN 500 MG
1000 TABLET ORAL ONCE
Refills: 0 | Status: COMPLETED | OUTPATIENT
Start: 2024-07-30 | End: 2024-07-30

## 2024-07-30 RX ORDER — SIMETHICONE 125 MG/1
80 TABLET, CHEWABLE ORAL EVERY 4 HOURS
Refills: 0 | Status: DISCONTINUED | OUTPATIENT
Start: 2024-07-30 | End: 2024-08-02

## 2024-07-30 RX ORDER — IBUPROFEN 200 MG
600 TABLET ORAL EVERY 6 HOURS
Refills: 0 | Status: COMPLETED | OUTPATIENT
Start: 2024-07-30 | End: 2025-06-28

## 2024-07-30 RX ORDER — CARBOPROST TROMETHAMINE 250 UG/ML
250 INJECTION, SOLUTION INTRAMUSCULAR ONCE
Refills: 0 | Status: COMPLETED | OUTPATIENT
Start: 2024-07-30 | End: 2024-07-30

## 2024-07-30 RX ORDER — METOCLOPRAMIDE HCL 5 MG/ML
10 VIAL (ML) INJECTION ONCE
Refills: 0 | Status: COMPLETED | OUTPATIENT
Start: 2024-07-30 | End: 2024-07-30

## 2024-07-30 RX ORDER — OXYCODONE HYDROCHLORIDE 30 MG/1
5 TABLET ORAL
Refills: 0 | Status: DISCONTINUED | OUTPATIENT
Start: 2024-07-30 | End: 2024-08-02

## 2024-07-30 RX ORDER — METHYLERGONOVINE MALEATE 0.2 MG
0.2 TABLET ORAL ONCE
Refills: 0 | Status: COMPLETED | OUTPATIENT
Start: 2024-07-30 | End: 2024-07-30

## 2024-07-30 RX ORDER — DIPHENHYDRAMINE HCL 25 MG
25 CAPSULE ORAL EVERY 6 HOURS
Refills: 0 | Status: DISCONTINUED | OUTPATIENT
Start: 2024-07-30 | End: 2024-08-02

## 2024-07-30 RX ORDER — OXYTOCIN/RINGER'S LACTATE 20/1000 ML
41.67 PLASTIC BAG, INJECTION (ML) INTRAVENOUS
Qty: 20 | Refills: 0 | Status: DISCONTINUED | OUTPATIENT
Start: 2024-07-30 | End: 2024-07-30

## 2024-07-30 RX ORDER — CEFAZOLIN SODIUM 10 G
2000 VIAL (EA) INJECTION ONCE
Refills: 0 | Status: COMPLETED | OUTPATIENT
Start: 2024-07-30 | End: 2024-07-30

## 2024-07-30 RX ORDER — CRANBERRY FRUIT EXTRACT 650 MG
1 CAPSULE ORAL DAILY
Refills: 0 | Status: DISCONTINUED | OUTPATIENT
Start: 2024-07-30 | End: 2024-08-02

## 2024-07-30 RX ADMIN — Medication 10 MILLIGRAM(S): at 03:09

## 2024-07-30 RX ADMIN — Medication 3 MILLILITER(S): at 21:59

## 2024-07-30 RX ADMIN — Medication 600 MILLIGRAM(S): at 17:36

## 2024-07-30 RX ADMIN — Medication 975 MILLIGRAM(S): at 21:57

## 2024-07-30 RX ADMIN — Medication 600 MILLIGRAM(S): at 12:15

## 2024-07-30 RX ADMIN — Medication 400 MILLIGRAM(S): at 04:32

## 2024-07-30 RX ADMIN — Medication 30 MILLIGRAM(S): at 06:38

## 2024-07-30 RX ADMIN — Medication 100 MILLIGRAM(S): at 03:07

## 2024-07-30 RX ADMIN — Medication 975 MILLIGRAM(S): at 17:14

## 2024-07-30 RX ADMIN — Medication 975 MILLIGRAM(S): at 09:48

## 2024-07-30 RX ADMIN — Medication 975 MILLIGRAM(S): at 16:25

## 2024-07-30 RX ADMIN — Medication 975 MILLIGRAM(S): at 08:52

## 2024-07-30 RX ADMIN — Medication 30 MILLIGRAM(S): at 07:15

## 2024-07-30 RX ADMIN — Medication 0.2 MILLIGRAM(S): at 01:26

## 2024-07-30 RX ADMIN — Medication 600 MILLIGRAM(S): at 13:00

## 2024-07-30 RX ADMIN — Medication 3 MILLILITER(S): at 14:02

## 2024-07-30 RX ADMIN — Medication 600 MILLIGRAM(S): at 17:59

## 2024-07-30 RX ADMIN — Medication 1 TABLET(S): at 01:30

## 2024-07-30 RX ADMIN — Medication 600 MILLIGRAM(S): at 23:59

## 2024-07-30 RX ADMIN — Medication 4 MILLIGRAM(S): at 04:36

## 2024-07-30 RX ADMIN — Medication 4 MILLIGRAM(S): at 01:51

## 2024-07-30 RX ADMIN — Medication 975 MILLIGRAM(S): at 21:27

## 2024-07-30 RX ADMIN — CARBOPROST TROMETHAMINE 250 MICROGRAM(S): 250 INJECTION, SOLUTION INTRAMUSCULAR at 01:30

## 2024-07-30 RX ADMIN — Medication 1000 MILLIGRAM(S): at 05:41

## 2024-07-30 NOTE — OB RN DELIVERY SUMMARY - NSSELHIDDEN_OBGYN_ALL_OB_FT
[NS_DeliveryAttending1_OBGYN_ALL_OB_FT:[id number]],[NS_DeliveryAssist1_OBGYN_ALL_OB_FT:[id number]],[NS_DeliveryRN_OBGYN_ALL_OB_FT:[id number]],[NS_DeliveryAssist2_OBGYN_ALL_OB_FT:[id number]]

## 2024-07-30 NOTE — OB RN DELIVERY SUMMARY - NS_LABORCHARACTER_OBGYN_ALL_OB
Induction of labor-AROM/Induction of labor-Medicinal/Augmentation of labor/Other - excessive bleeding/External electronic FM

## 2024-07-30 NOTE — OB PROVIDER DELIVERY SUMMARY - NSLOWPPHRISK_OBGYN_A_OB
No previous uterine incision/Reese Pregnancy/Less than or equal to 4 previous vaginal births/No known bleeding disorder/No other PPH risks indicated

## 2024-07-30 NOTE — OB RN DELIVERY SUMMARY - NS_SEPSISRSKCALC_OBGYN_ALL_OB_FT
EOS calculated successfully. EOS Risk Factor: 0.5/1000 live births ([facility] national incidence); GA=40w;Temp=98.6; ROM=3.817; GBS='Negative'; Antibiotics='No antibiotics or any antibiotics < 2 hrs prior to birth'

## 2024-07-30 NOTE — CHART NOTE - NSCHARTNOTEFT_GEN_A_CORE
Pt assessed at bedside.     Pt s/p  1 hour ago. Uterine atony noted immediately at the time of delivery. Pt received Methergine x1, Hemabate x1, 1000mcg cytotec [place]   ALAN was placed under ultrasound guidance. Hooked up to suction. Total EBL: 1000    STAT CBC / Lactate sent.  1L bolus     Re-evaluated 1 hour later(~2:45a).   Overall well appearing. Engaging in skin/skin. Reports nausea responsive to Zofran,.   HR: 99 BP: 126/65   STAT labs: 12.6/36.5  -> 11.9/38.2   100cc of output from ALAN. Minimal vaginal bleeding on exam  ALAN to remain in place. Plan to reassess in 1 hour    DW: Dr. Mihir Jorge, PGY-4 Pt assessed at bedside.     Pt s/p  1 hour ago. Uterine atony noted immediately at the time of delivery. Pt received Methergine x1, Hemabate x1, 1000mcg cytotec [place]   ALAN was placed under ultrasound guidance. Hooked up to suction. Total EBL: 1000    STAT CBC / Lactate sent.  1L bolus     Re-evaluated 1 hour later(~2:45a).   Overall well appearing. Engaging in skin/skin. Reports nausea responsive to Zofran,.   HR: 99 BP: 126/65   STAT labs: 12.6/36.5  -> 11.9/38.2   100cc of output from ALAN. Minimal vaginal bleeding on exam  ALAN to remain in place. Plan to reassess in 1 hour  Ancef x1     Reassessed 1 hour later   Patient feels better. Nausea resolved. Resting comfortably.   HR: 97 BP: 144/67   30cc additional over next hour. ALAN taken off suction. Deflated. New pad placed.        DW: Dr. Mihir Jorge, PGY-4 Pt assessed at bedside.     Pt s/p  1 hour ago. Uterine atony noted immediately at the time of delivery. Pt received Methergine x1, Hemabate x1, 1000mcg cytotec [place]   ALAN was placed under ultrasound guidance. Hooked up to suction. Total EBL: 1000    STAT CBC / Lactate sent.  1L bolus     Re-evaluated 1 hour later(~2:45a).   Overall well appearing. Engaging in skin/skin. Reports nausea responsive to Zofran,.   HR: 99 BP: 126/65   STAT labs: 12.6/36.5  -> 11.9/38.2   100cc of output from ALAN. Minimal vaginal bleeding on exam  ALAN to remain in place. Plan to reassess in 1 hour  Ancef x1     Reassessed 1 hour later   Patient feels better. Nausea resolved. Resting comfortably.   HR: 97 BP: 144/67   30cc additional over next hour. ALAN taken off suction. Deflated. New pad placed    Reassessed 30 min later.    No additional bleeding noted on pad. ALAN removed without incident   BSUS demonstrates a thin endometrial stripe.   No bleeding expressed with fundal exam   F/U AM lab work     Total EBL: 1130    DW: Dr. Mihir Jorge, PGY-4

## 2024-07-30 NOTE — OB PROVIDER DELIVERY SUMMARY - NSPROVIDERDELIVERYNOTE_OBGYN_ALL_OB_FT
Patient found to be fully dilated with urge to push.  Pushed x 10 mins w/  of viable infant.  Head, shoulders and body delivered easily.  Placenta delivered intact.   After placental delivery patient had brisk bleeding from uterine atony.  Bimanual massage performed as pitocin administered.  Methergine IM x 1 administered.  Hemabate x 1 administered.  Cytotac 1000 [place] administered.   ALAN device placed into uterine cavity under direct visualization with ultrasound.   Uterine tone noted to respond well with minimal bleeding after ALAN device placement.   Vaginal exam revealed intact cervix, vaginal walls.  2nd degree laceration identified and repaired in usual fashion with 2-0 chromic suture.   Excellent hemostasis noted at conclusion.   Events of delivery discussed with patient,  and mother at bedside.  All questions/concerns addressed to their apparent satisfaction.  stat cbc drawn.  unable to draw lactate or coags due to vein clotting and patient difficult stick and declining further blood draws.  will f/u CBC  result and f/u LAAN output one hour from placement.

## 2024-07-31 LAB
ALBUMIN SERPL ELPH-MCNC: 2.6 G/DL — LOW (ref 3.3–5)
ALP SERPL-CCNC: 121 U/L — HIGH (ref 40–120)
ALT FLD-CCNC: 11 U/L — SIGNIFICANT CHANGE UP (ref 4–33)
ANION GAP SERPL CALC-SCNC: 8 MMOL/L — SIGNIFICANT CHANGE UP (ref 7–14)
AST SERPL-CCNC: 27 U/L — SIGNIFICANT CHANGE UP (ref 4–32)
BASOPHILS # BLD AUTO: 0.02 K/UL — SIGNIFICANT CHANGE UP (ref 0–0.2)
BASOPHILS NFR BLD AUTO: 0.2 % — SIGNIFICANT CHANGE UP (ref 0–2)
BILIRUB SERPL-MCNC: <0.2 MG/DL — SIGNIFICANT CHANGE UP (ref 0.2–1.2)
BUN SERPL-MCNC: 10 MG/DL — SIGNIFICANT CHANGE UP (ref 7–23)
CALCIUM SERPL-MCNC: 8.3 MG/DL — LOW (ref 8.4–10.5)
CHLORIDE SERPL-SCNC: 109 MMOL/L — HIGH (ref 98–107)
CO2 SERPL-SCNC: 18 MMOL/L — LOW (ref 22–31)
CREAT SERPL-MCNC: 0.85 MG/DL — SIGNIFICANT CHANGE UP (ref 0.5–1.3)
EGFR: 94 ML/MIN/1.73M2 — SIGNIFICANT CHANGE UP
EOSINOPHIL # BLD AUTO: 0.11 K/UL — SIGNIFICANT CHANGE UP (ref 0–0.5)
EOSINOPHIL NFR BLD AUTO: 1 % — SIGNIFICANT CHANGE UP (ref 0–6)
GLUCOSE SERPL-MCNC: 84 MG/DL — SIGNIFICANT CHANGE UP (ref 70–99)
HCT VFR BLD CALC: 27.8 % — LOW (ref 34.5–45)
HGB BLD-MCNC: 9.3 G/DL — LOW (ref 11.5–15.5)
IANC: 8.9 K/UL — HIGH (ref 1.8–7.4)
IMM GRANULOCYTES NFR BLD AUTO: 0.8 % — SIGNIFICANT CHANGE UP (ref 0–0.9)
LYMPHOCYTES # BLD AUTO: 1.68 K/UL — SIGNIFICANT CHANGE UP (ref 1–3.3)
LYMPHOCYTES # BLD AUTO: 14.8 % — SIGNIFICANT CHANGE UP (ref 13–44)
MCHC RBC-ENTMCNC: 29.9 PG — SIGNIFICANT CHANGE UP (ref 27–34)
MCHC RBC-ENTMCNC: 33.5 GM/DL — SIGNIFICANT CHANGE UP (ref 32–36)
MCV RBC AUTO: 89.4 FL — SIGNIFICANT CHANGE UP (ref 80–100)
MONOCYTES # BLD AUTO: 0.57 K/UL — SIGNIFICANT CHANGE UP (ref 0–0.9)
MONOCYTES NFR BLD AUTO: 5 % — SIGNIFICANT CHANGE UP (ref 2–14)
NEUTROPHILS # BLD AUTO: 8.9 K/UL — HIGH (ref 1.8–7.4)
NEUTROPHILS NFR BLD AUTO: 78.2 % — HIGH (ref 43–77)
NRBC # BLD: 0 /100 WBCS — SIGNIFICANT CHANGE UP (ref 0–0)
NRBC # FLD: 0 K/UL — SIGNIFICANT CHANGE UP (ref 0–0)
PLATELET # BLD AUTO: 221 K/UL — SIGNIFICANT CHANGE UP (ref 150–400)
POTASSIUM SERPL-MCNC: 4.3 MMOL/L — SIGNIFICANT CHANGE UP (ref 3.5–5.3)
POTASSIUM SERPL-SCNC: 4.3 MMOL/L — SIGNIFICANT CHANGE UP (ref 3.5–5.3)
PROT SERPL-MCNC: 5 G/DL — LOW (ref 6–8.3)
RBC # BLD: 3.11 M/UL — LOW (ref 3.8–5.2)
RBC # FLD: 15.8 % — HIGH (ref 10.3–14.5)
SODIUM SERPL-SCNC: 135 MMOL/L — SIGNIFICANT CHANGE UP (ref 135–145)
WBC # BLD: 11.37 K/UL — HIGH (ref 3.8–10.5)
WBC # FLD AUTO: 11.37 K/UL — HIGH (ref 3.8–10.5)

## 2024-07-31 RX ORDER — DIPHENHYDRAMINE HCL 25 MG
25 CAPSULE ORAL ONCE
Refills: 0 | Status: COMPLETED | OUTPATIENT
Start: 2024-07-31 | End: 2024-07-31

## 2024-07-31 RX ORDER — METOCLOPRAMIDE HCL 5 MG/ML
10 VIAL (ML) INJECTION ONCE
Refills: 0 | Status: COMPLETED | OUTPATIENT
Start: 2024-07-31 | End: 2024-07-31

## 2024-07-31 RX ORDER — ACETAMINOPHEN 500 MG
650 TABLET ORAL
Refills: 0 | Status: DISCONTINUED | OUTPATIENT
Start: 2024-07-31 | End: 2024-08-01

## 2024-07-31 RX ORDER — BUTALB/ACETAMINOPHEN/CAFFEINE 50-325-40
1 TABLET ORAL EVERY 6 HOURS
Refills: 0 | Status: DISCONTINUED | OUTPATIENT
Start: 2024-07-31 | End: 2024-08-01

## 2024-07-31 RX ADMIN — Medication 600 MILLIGRAM(S): at 06:22

## 2024-07-31 RX ADMIN — Medication 1 TABLET(S): at 22:07

## 2024-07-31 RX ADMIN — Medication 3 MILLILITER(S): at 14:46

## 2024-07-31 RX ADMIN — Medication 975 MILLIGRAM(S): at 14:21

## 2024-07-31 RX ADMIN — Medication 1 TABLET(S): at 11:30

## 2024-07-31 RX ADMIN — Medication 600 MILLIGRAM(S): at 17:52

## 2024-07-31 RX ADMIN — Medication 975 MILLIGRAM(S): at 15:20

## 2024-07-31 RX ADMIN — Medication 600 MILLIGRAM(S): at 05:52

## 2024-07-31 RX ADMIN — Medication 975 MILLIGRAM(S): at 09:40

## 2024-07-31 RX ADMIN — Medication 10 MILLIGRAM(S): at 14:20

## 2024-07-31 RX ADMIN — Medication 25 MILLIGRAM(S): at 14:20

## 2024-07-31 RX ADMIN — Medication 600 MILLIGRAM(S): at 12:30

## 2024-07-31 RX ADMIN — Medication 1 TABLET(S): at 21:27

## 2024-07-31 RX ADMIN — Medication 3 MILLILITER(S): at 22:47

## 2024-07-31 RX ADMIN — Medication 600 MILLIGRAM(S): at 11:30

## 2024-07-31 RX ADMIN — Medication 600 MILLIGRAM(S): at 18:50

## 2024-07-31 RX ADMIN — Medication 600 MILLIGRAM(S): at 00:29

## 2024-07-31 RX ADMIN — Medication 3 MILLILITER(S): at 06:19

## 2024-07-31 RX ADMIN — Medication 975 MILLIGRAM(S): at 08:44

## 2024-07-31 NOTE — CHART NOTE - NSCHARTNOTEFT_GEN_A_CORE
Pt met criteria for gHTN. Pt counseled at bedside about new diagnosis and importance of monitoring for symptoms of pre-eclampsia. Patient aware that she will continue to monitor bp at home and will have close follow-up for bp check after discharge. All questions answered. Pt expressed understanding and is amenable to the plan. Denies headache, vision changes, SOB, epigastric/RUQ pain.    Jake Zamarripa PGY1  Dr. Adilia gomez

## 2024-07-31 NOTE — PROGRESS NOTE ADULT - ATTENDING COMMENTS
Patient seen and agree with above.  C/o HA , moderate, was relieved with sleep, but now recurred and not responding to tylenol and motrin  VSS    Ext 1+ edema    A/P  PPD #1   anemia from acute blood loss, hemodynamically stable  GHTN, labs this am wnl, c/o HA and edema  Try reglan and benadryl for HA, if not resolved or recurs, then needs magnesium sulfate   XAVIER Busby

## 2024-07-31 NOTE — PROGRESS NOTE ADULT - ASSESSMENT
A/P: 28yo  PPD#1 s/p .  Patient is stable and doing well post-partum.  #gHTN  - BP wnl o/n  - Counseled for gHTN diagnosis  - f/u AM CBC/CMP    #PPH      - QBL: 1000+130 ([place])       - Hct: 36.5->38.2->33.4   - s/p Methergine, Hemabate, Felicia OLIVEIRA ( @130a)  - f/u AM CBC    #   - Pain well controlled, continue current pain regimen  - Continue ambulation  - Continue regular diet    Jake Zamarripa PGY1

## 2024-07-31 NOTE — PROGRESS NOTE ADULT - REASON FOR ADMISSION
I was called to evaluate this patient's lateral HA that developed the day after an uneventful epidural, per record. Pt describes one-sided HA consistent with her migraines (rare). No positional component, no tinnitus, no neck or shoulder pain, no fronto-occipital component. Pt denies elevated BP or fever. I requested that the OB team following her consider Fioricet. Hold Tylenol when taking Fioricet. R Nelson Colón MD, PhD

## 2024-07-31 NOTE — CHART NOTE - NSCHARTNOTEFT_GEN_A_CORE
Patient evaluated by Dr. Castillo - patient continues to report a one-sided headache. Not resolved with tylenol/reglan/benadryl. Esgic ordered for migraine-like presentation, first dose at 8pm, will continue to monitor.     D/w Dr. Macchiarella A Sacks, PGY1 Patient evaluated by Dr. Busby - patient continues to report a one-sided headache. Not resolved with tylenol/reglan/benadryl. Esgic ordered for migraine-like presentation, first dose at 8pm, will continue to monitor.     D/w Dr. Hessel A Sacks, PGY1

## 2024-08-01 LAB
ALBUMIN SERPL ELPH-MCNC: 2.9 G/DL — LOW (ref 3.3–5)
ALP SERPL-CCNC: 112 U/L — SIGNIFICANT CHANGE UP (ref 40–120)
ALT FLD-CCNC: 12 U/L — SIGNIFICANT CHANGE UP (ref 4–33)
ANION GAP SERPL CALC-SCNC: 9 MMOL/L — SIGNIFICANT CHANGE UP (ref 7–14)
AST SERPL-CCNC: 20 U/L — SIGNIFICANT CHANGE UP (ref 4–32)
BASOPHILS # BLD AUTO: 0.02 K/UL — SIGNIFICANT CHANGE UP (ref 0–0.2)
BASOPHILS NFR BLD AUTO: 0.2 % — SIGNIFICANT CHANGE UP (ref 0–2)
BILIRUB SERPL-MCNC: <0.2 MG/DL — SIGNIFICANT CHANGE UP (ref 0.2–1.2)
BUN SERPL-MCNC: 9 MG/DL — SIGNIFICANT CHANGE UP (ref 7–23)
CALCIUM SERPL-MCNC: 8.4 MG/DL — SIGNIFICANT CHANGE UP (ref 8.4–10.5)
CHLORIDE SERPL-SCNC: 107 MMOL/L — SIGNIFICANT CHANGE UP (ref 98–107)
CO2 SERPL-SCNC: 22 MMOL/L — SIGNIFICANT CHANGE UP (ref 22–31)
CREAT SERPL-MCNC: 0.71 MG/DL — SIGNIFICANT CHANGE UP (ref 0.5–1.3)
EGFR: 117 ML/MIN/1.73M2 — SIGNIFICANT CHANGE UP
EOSINOPHIL # BLD AUTO: 0.15 K/UL — SIGNIFICANT CHANGE UP (ref 0–0.5)
EOSINOPHIL NFR BLD AUTO: 1.6 % — SIGNIFICANT CHANGE UP (ref 0–6)
GLUCOSE SERPL-MCNC: 89 MG/DL — SIGNIFICANT CHANGE UP (ref 70–99)
HCT VFR BLD CALC: 25.2 % — LOW (ref 34.5–45)
HGB BLD-MCNC: 8.3 G/DL — LOW (ref 11.5–15.5)
IANC: 6.95 K/UL — SIGNIFICANT CHANGE UP (ref 1.8–7.4)
IMM GRANULOCYTES NFR BLD AUTO: 1.5 % — HIGH (ref 0–0.9)
LDH SERPL L TO P-CCNC: 224 U/L — SIGNIFICANT CHANGE UP (ref 135–225)
LYMPHOCYTES # BLD AUTO: 1.86 K/UL — SIGNIFICANT CHANGE UP (ref 1–3.3)
LYMPHOCYTES # BLD AUTO: 19.4 % — SIGNIFICANT CHANGE UP (ref 13–44)
MAGNESIUM SERPL-MCNC: 4.4 MG/DL — HIGH (ref 1.6–2.6)
MAGNESIUM SERPL-MCNC: 6 MG/DL — HIGH (ref 1.6–2.6)
MCHC RBC-ENTMCNC: 29.6 PG — SIGNIFICANT CHANGE UP (ref 27–34)
MCHC RBC-ENTMCNC: 32.9 GM/DL — SIGNIFICANT CHANGE UP (ref 32–36)
MCV RBC AUTO: 90 FL — SIGNIFICANT CHANGE UP (ref 80–100)
MONOCYTES # BLD AUTO: 0.48 K/UL — SIGNIFICANT CHANGE UP (ref 0–0.9)
MONOCYTES NFR BLD AUTO: 5 % — SIGNIFICANT CHANGE UP (ref 2–14)
NEUTROPHILS # BLD AUTO: 6.95 K/UL — SIGNIFICANT CHANGE UP (ref 1.8–7.4)
NEUTROPHILS NFR BLD AUTO: 72.3 % — SIGNIFICANT CHANGE UP (ref 43–77)
NRBC # BLD: 0 /100 WBCS — SIGNIFICANT CHANGE UP (ref 0–0)
NRBC # FLD: 0 K/UL — SIGNIFICANT CHANGE UP (ref 0–0)
PLATELET # BLD AUTO: 269 K/UL — SIGNIFICANT CHANGE UP (ref 150–400)
POTASSIUM SERPL-MCNC: 3.9 MMOL/L — SIGNIFICANT CHANGE UP (ref 3.5–5.3)
POTASSIUM SERPL-SCNC: 3.9 MMOL/L — SIGNIFICANT CHANGE UP (ref 3.5–5.3)
PROT SERPL-MCNC: 5.2 G/DL — LOW (ref 6–8.3)
RBC # BLD: 2.8 M/UL — LOW (ref 3.8–5.2)
RBC # FLD: 15.3 % — HIGH (ref 10.3–14.5)
SODIUM SERPL-SCNC: 138 MMOL/L — SIGNIFICANT CHANGE UP (ref 135–145)
URATE SERPL-MCNC: 4.3 MG/DL — SIGNIFICANT CHANGE UP (ref 2.5–7)
WBC # BLD: 9.6 K/UL — SIGNIFICANT CHANGE UP (ref 3.8–10.5)
WBC # FLD AUTO: 9.6 K/UL — SIGNIFICANT CHANGE UP (ref 3.8–10.5)

## 2024-08-01 PROCEDURE — 70450 CT HEAD/BRAIN W/O DYE: CPT | Mod: 26

## 2024-08-01 RX ORDER — MAGNESIUM SULFATE 500 MG/ML
2 VIAL (ML) INJECTION
Qty: 40 | Refills: 0 | Status: DISCONTINUED | OUTPATIENT
Start: 2024-08-01 | End: 2024-08-02

## 2024-08-01 RX ORDER — DEXTROSE MONOHYDRATE, SODIUM CHLORIDE, SODIUM LACTATE, CALCIUM CHLORIDE, MAGNESIUM CHLORIDE 1.5; 538; 448; 18.4; 5.08 G/100ML; MG/100ML; MG/100ML; MG/100ML; MG/100ML
1000 SOLUTION INTRAPERITONEAL
Refills: 0 | Status: DISCONTINUED | OUTPATIENT
Start: 2024-08-01 | End: 2024-08-02

## 2024-08-01 RX ORDER — ACETAMINOPHEN 500 MG
975 TABLET ORAL EVERY 6 HOURS
Refills: 0 | Status: DISCONTINUED | OUTPATIENT
Start: 2024-08-01 | End: 2024-08-02

## 2024-08-01 RX ORDER — MAGNESIUM SULFATE 500 MG/ML
4 VIAL (ML) INJECTION ONCE
Refills: 0 | Status: COMPLETED | OUTPATIENT
Start: 2024-08-01 | End: 2024-08-01

## 2024-08-01 RX ORDER — ACETAMINOPHEN 500 MG
975 TABLET ORAL ONCE
Refills: 0 | Status: COMPLETED | OUTPATIENT
Start: 2024-08-01 | End: 2024-08-01

## 2024-08-01 RX ADMIN — Medication 975 MILLIGRAM(S): at 09:28

## 2024-08-01 RX ADMIN — Medication 300 GRAM(S): at 03:59

## 2024-08-01 RX ADMIN — Medication 600 MILLIGRAM(S): at 11:55

## 2024-08-01 RX ADMIN — Medication 600 MILLIGRAM(S): at 06:55

## 2024-08-01 RX ADMIN — Medication 600 MILLIGRAM(S): at 18:25

## 2024-08-01 RX ADMIN — Medication 650 MILLIGRAM(S): at 04:47

## 2024-08-01 RX ADMIN — Medication 50 GM/HR: at 04:24

## 2024-08-01 RX ADMIN — Medication 600 MILLIGRAM(S): at 23:55

## 2024-08-01 RX ADMIN — Medication 650 MILLIGRAM(S): at 03:49

## 2024-08-01 RX ADMIN — Medication 975 MILLIGRAM(S): at 20:24

## 2024-08-01 RX ADMIN — Medication 600 MILLIGRAM(S): at 01:40

## 2024-08-01 RX ADMIN — Medication 600 MILLIGRAM(S): at 23:25

## 2024-08-01 RX ADMIN — Medication 50 GM/HR: at 07:27

## 2024-08-01 RX ADMIN — Medication 600 MILLIGRAM(S): at 06:36

## 2024-08-01 RX ADMIN — Medication 50 GM/HR: at 19:24

## 2024-08-01 RX ADMIN — Medication 3 MILLILITER(S): at 06:55

## 2024-08-01 RX ADMIN — DEXTROSE MONOHYDRATE, SODIUM CHLORIDE, SODIUM LACTATE, CALCIUM CHLORIDE, MAGNESIUM CHLORIDE 50 MILLILITER(S): 1.5; 538; 448; 18.4; 5.08 SOLUTION INTRAPERITONEAL at 04:26

## 2024-08-01 RX ADMIN — Medication 600 MILLIGRAM(S): at 02:20

## 2024-08-01 RX ADMIN — Medication 975 MILLIGRAM(S): at 20:54

## 2024-08-01 RX ADMIN — Medication 3 MILLILITER(S): at 21:04

## 2024-08-01 RX ADMIN — Medication 600 MILLIGRAM(S): at 12:30

## 2024-08-01 RX ADMIN — Medication 1 TABLET(S): at 11:55

## 2024-08-01 NOTE — PROGRESS NOTE ADULT - ASSESSMENT
A/P: 28yo  PPD#2 s/p .  Patient is stable and feels well post-partum.  #sPEC  - Pt met criteria o/n due to HA/blurry vision/hyperreflexia.  - s/p Tyl/reglan/benadryl x1, s/p Esgic x1  - Mg sulfate (-)   - HELLP labs wnl  - C/w BP monitoring    #PPH      - QBL: 1000+130 (Atony)      - Hct: 36.5->38.2->33.4->27.8      - Methergine, Hemabate, RC, Felicia ( @130a)     #  - Pain well controlled, continue current pain regimen  - Continue ambulation  - Continue regular diet  - Discharge planning     Jake Zamarripa PGY1 A/P: 28yo  PPD#2 s/p .  Patient is stable and feels well post-partum.  #sPEC  - Pt met criteria o/n due to HA/blurry vision/hyperreflexia.  - s/p Tyl/reglan/benadryl x1, s/p Esgic x1  - Mg sulfate (-)   - HELLP labs wnl  - C/w BP monitoring    #PPH      - QBL: 1000+130 (Atony)      - Hct: 36.5->38.2->33.4->27.8      - Methergine, Hemabate, RC, Felicia ( @130a)     #  - Pain well controlled, continue current pain regimen  - Continue ambulation  - Requested SCD machine, RN to place  - Continue regular diet  - Discharge planning     Jake Zamarripa PGY1

## 2024-08-01 NOTE — PROGRESS NOTE ADULT - ATTENDING COMMENTS
Attending Note       ASHLEY Pat Md Attending Note     agree above  pt reports having headache, on mg   no vision changes, RUQ or epigastric pian   abd soft, fundus firm nontender  Perineum healing well   Ext 2 + edema b/l     A/P: PPD 2 s/p  c/w PPH And pre-x currently on mg   asymptomatic anemia from acute blood loss, continue iron   severe pre-x bps not requiring meds, continue mg   if headache does not resolve will need imaging   routine postpartum care    ASHLEY Pat Md

## 2024-08-01 NOTE — CHART NOTE - NSCHARTNOTEFT_GEN_A_CORE
Pt reassessed to monitor headache symptoms.     S: Pt endorsing baseline 2-3/10 HA, worsening following blood draw attempts for Mg level. Localizes headache to frontal and orbital region b/l, and to R maxilla. Endorsing blurry vision at distance, denies proximal blurry vision. Denies scotomas, flashing lights, floaters. Denies SOB. Denies epigastric/RUQ pain.     Pt amenable to 1 addl blood draw attempt.     O:  Vital Signs Last 24 Hrs  T(C): 36.8 (01 Aug 2024 12:30), Max: 37.1 (01 Aug 2024 01:43)  T(F): 98.2 (01 Aug 2024 12:30), Max: 98.7 (01 Aug 2024 01:43)  HR: 76 (01 Aug 2024 18:29) (70 - 88)  BP: 128/84 (01 Aug 2024 18:29) (105/74 - 141/80)  BP(mean): --  RR: 16 (01 Aug 2024 18:29) (16 - 19)  SpO2: 99% (01 Aug 2024 18:29) (97% - 100%)    Parameters below as of 01 Aug 2024 18:29  Patient On (Oxygen Delivery Method): room air    General: NAD, resting in bed  Resp: no increased WOB  Cards: well-perfused    A/P:  3oyo  PPD2  on Mg w/headache.   Pt endorsed improvement with Esgic. Considering persistent headache, plan to administer Esgic.   R/o head imaging, ordered for CT head w/wo contrast.  Nursing to reattempt blood draw for Mg level. F/u Mg level    Jake Zamarripa PGY1  Dw Dr. Chopra

## 2024-08-01 NOTE — CHART NOTE - NSCHARTNOTEFT_GEN_A_CORE
Delayed due to clinical duties     Patient evaluated ~4pm for follow up of headache. Patient describes it as a 2-3/10 on the pain scale. Describes it as pain behind her right eye that sometimes migrates to the left, accompanied by blurry vision. Denies double vision/floaters/scotoma, N/V, light headedness/dizziness, CP/SOB. Reports feeling groggy on the magnesium during our conversation.     She reports a history of <5 migraines in her lifetime. Cannot say whether this is similar to those headaches because she does not remember. Says her symptoms overall are not very bothersome, but she does notice them.     At ~6:45pm, nursing called to report that they were having trouble drawing Mg level due to her being a hard stick, patient now refusing Mg level being drawn. Will be re-evaluated by night team to assess for resolution of headaches.     A Sacks, PGY1

## 2024-08-01 NOTE — CHART NOTE - NSCHARTNOTESELECT_GEN_ALL_CORE
ALAN
Event Note
gHTN Counseling/Event Note
Event Note
Headache/Event Note
OROZCO follow up
migraine headache
sPEC/Event Note

## 2024-08-01 NOTE — CHART NOTE - NSCHARTNOTEFT_GEN_A_CORE
Pt seen 8/1 2:30AM, late charting due to clinical responsibilities.    Pt seen at bedside to reassess headache symptoms. Pt states Esgic improved HA pain but endorses 2/10, worsening, R-sided crossing midline to L-sided headache in the frontal and orbital regions and in the maxillary sinuses. Headache worsened with light. Also endorsing intermittent blurry vision. Denies SOB, epigastric/RUQ pain. Endorsing mild LUQ pain. Endorsing worsening LE swelling b/l.     On exam, pt lying comfortably in bed. B/l patellar and achilles tendon hyperreflexia.    I/S/O blurry vision, persistent HA, and hyperreflexia, decision to draw STAT HELLP labs and begin magnesium sulfate infusion for seizure prophylaxis.    Pt counseled at bedside about new diagnosis and need to start magnesium to prevent seizures and progression to eclampsia. Patient aware that she will continue to monitor bp at home and will have close follow-up for bp check 1-3 days after discharge. All questions answered. Pt expressed understanding and is amenable to the plan.    Jake Zamarripa PGY1  D/w Dr. Dumont

## 2024-08-02 ENCOUNTER — TRANSCRIPTION ENCOUNTER (OUTPATIENT)
Age: 29
End: 2024-08-02

## 2024-08-02 VITALS
OXYGEN SATURATION: 99 % | HEART RATE: 77 BPM | TEMPERATURE: 98 F | SYSTOLIC BLOOD PRESSURE: 130 MMHG | DIASTOLIC BLOOD PRESSURE: 77 MMHG | RESPIRATION RATE: 18 BRPM

## 2024-08-02 LAB
ALBUMIN SERPL ELPH-MCNC: 3.2 G/DL — LOW (ref 3.3–5)
ALP SERPL-CCNC: 116 U/L — SIGNIFICANT CHANGE UP (ref 40–120)
ALT FLD-CCNC: 16 U/L — SIGNIFICANT CHANGE UP (ref 4–33)
ANION GAP SERPL CALC-SCNC: 10 MMOL/L — SIGNIFICANT CHANGE UP (ref 7–14)
APTT BLD: 28 SEC — SIGNIFICANT CHANGE UP (ref 24.5–35.6)
AST SERPL-CCNC: 21 U/L — SIGNIFICANT CHANGE UP (ref 4–32)
BASOPHILS # BLD AUTO: 0.04 K/UL — SIGNIFICANT CHANGE UP (ref 0–0.2)
BASOPHILS NFR BLD AUTO: 0.5 % — SIGNIFICANT CHANGE UP (ref 0–2)
BILIRUB SERPL-MCNC: <0.2 MG/DL — SIGNIFICANT CHANGE UP (ref 0.2–1.2)
BUN SERPL-MCNC: 7 MG/DL — SIGNIFICANT CHANGE UP (ref 7–23)
CALCIUM SERPL-MCNC: 7.3 MG/DL — LOW (ref 8.4–10.5)
CHLORIDE SERPL-SCNC: 106 MMOL/L — SIGNIFICANT CHANGE UP (ref 98–107)
CO2 SERPL-SCNC: 23 MMOL/L — SIGNIFICANT CHANGE UP (ref 22–31)
CREAT SERPL-MCNC: 0.73 MG/DL — SIGNIFICANT CHANGE UP (ref 0.5–1.3)
EGFR: 113 ML/MIN/1.73M2 — SIGNIFICANT CHANGE UP
EOSINOPHIL # BLD AUTO: 0.24 K/UL — SIGNIFICANT CHANGE UP (ref 0–0.5)
EOSINOPHIL NFR BLD AUTO: 2.8 % — SIGNIFICANT CHANGE UP (ref 0–6)
FIBRINOGEN PPP-MCNC: 504 MG/DL — HIGH (ref 200–465)
GLUCOSE SERPL-MCNC: 87 MG/DL — SIGNIFICANT CHANGE UP (ref 70–99)
HCT VFR BLD CALC: 28.1 % — LOW (ref 34.5–45)
HGB BLD-MCNC: 9.2 G/DL — LOW (ref 11.5–15.5)
IANC: 6.03 K/UL — SIGNIFICANT CHANGE UP (ref 1.8–7.4)
IMM GRANULOCYTES NFR BLD AUTO: 1.5 % — HIGH (ref 0–0.9)
INR BLD: <0.9 RATIO — LOW (ref 0.85–1.18)
LDH SERPL L TO P-CCNC: 254 U/L — HIGH (ref 135–225)
LYMPHOCYTES # BLD AUTO: 1.73 K/UL — SIGNIFICANT CHANGE UP (ref 1–3.3)
LYMPHOCYTES # BLD AUTO: 20.3 % — SIGNIFICANT CHANGE UP (ref 13–44)
MCHC RBC-ENTMCNC: 30.3 PG — SIGNIFICANT CHANGE UP (ref 27–34)
MCHC RBC-ENTMCNC: 32.7 GM/DL — SIGNIFICANT CHANGE UP (ref 32–36)
MCV RBC AUTO: 92.4 FL — SIGNIFICANT CHANGE UP (ref 80–100)
MONOCYTES # BLD AUTO: 0.35 K/UL — SIGNIFICANT CHANGE UP (ref 0–0.9)
MONOCYTES NFR BLD AUTO: 4.1 % — SIGNIFICANT CHANGE UP (ref 2–14)
NEUTROPHILS # BLD AUTO: 6.03 K/UL — SIGNIFICANT CHANGE UP (ref 1.8–7.4)
NEUTROPHILS NFR BLD AUTO: 70.8 % — SIGNIFICANT CHANGE UP (ref 43–77)
NRBC # BLD: 0 /100 WBCS — SIGNIFICANT CHANGE UP (ref 0–0)
NRBC # FLD: 0 K/UL — SIGNIFICANT CHANGE UP (ref 0–0)
PLATELET # BLD AUTO: 338 K/UL — SIGNIFICANT CHANGE UP (ref 150–400)
POTASSIUM SERPL-MCNC: 3.9 MMOL/L — SIGNIFICANT CHANGE UP (ref 3.5–5.3)
POTASSIUM SERPL-SCNC: 3.9 MMOL/L — SIGNIFICANT CHANGE UP (ref 3.5–5.3)
PROT SERPL-MCNC: 5.7 G/DL — LOW (ref 6–8.3)
PROTHROM AB SERPL-ACNC: 8.6 SEC — LOW (ref 9.5–13)
RBC # BLD: 3.04 M/UL — LOW (ref 3.8–5.2)
RBC # FLD: 15.6 % — HIGH (ref 10.3–14.5)
SODIUM SERPL-SCNC: 139 MMOL/L — SIGNIFICANT CHANGE UP (ref 135–145)
URATE SERPL-MCNC: 4.4 MG/DL — SIGNIFICANT CHANGE UP (ref 2.5–7)
WBC # BLD: 8.52 K/UL — SIGNIFICANT CHANGE UP (ref 3.8–10.5)
WBC # FLD AUTO: 8.52 K/UL — SIGNIFICANT CHANGE UP (ref 3.8–10.5)

## 2024-08-02 PROCEDURE — 99223 1ST HOSP IP/OBS HIGH 75: CPT | Mod: GC

## 2024-08-02 PROCEDURE — 70553 MRI BRAIN STEM W/O & W/DYE: CPT | Mod: 26

## 2024-08-02 RX ORDER — IBUPROFEN 200 MG
1 TABLET ORAL
Qty: 0 | Refills: 0 | DISCHARGE
Start: 2024-08-02

## 2024-08-02 RX ORDER — ACETAMINOPHEN 500 MG
3 TABLET ORAL
Qty: 0 | Refills: 0 | DISCHARGE
Start: 2024-08-02

## 2024-08-02 RX ORDER — MELATONIN 3 MG
3 TABLET ORAL AT BEDTIME
Refills: 0 | Status: DISCONTINUED | OUTPATIENT
Start: 2024-08-02 | End: 2024-08-02

## 2024-08-02 RX ORDER — BUTALB/ACETAMINOPHEN/CAFFEINE 50-325-40
1 TABLET ORAL ONCE
Refills: 0 | Status: DISCONTINUED | OUTPATIENT
Start: 2024-08-02 | End: 2024-08-02

## 2024-08-02 RX ADMIN — Medication 600 MILLIGRAM(S): at 12:25

## 2024-08-02 RX ADMIN — Medication 1 TABLET(S): at 12:24

## 2024-08-02 RX ADMIN — Medication 975 MILLIGRAM(S): at 09:45

## 2024-08-02 RX ADMIN — Medication 975 MILLIGRAM(S): at 02:38

## 2024-08-02 RX ADMIN — Medication 975 MILLIGRAM(S): at 09:18

## 2024-08-02 RX ADMIN — Medication 3 MILLIGRAM(S): at 02:17

## 2024-08-02 RX ADMIN — Medication 3 MILLILITER(S): at 06:37

## 2024-08-02 RX ADMIN — Medication 600 MILLIGRAM(S): at 12:55

## 2024-08-02 RX ADMIN — Medication 600 MILLIGRAM(S): at 06:37

## 2024-08-02 RX ADMIN — Medication 600 MILLIGRAM(S): at 06:06

## 2024-08-02 RX ADMIN — Medication 975 MILLIGRAM(S): at 02:08

## 2024-08-02 NOTE — CONSULT NOTE ADULT - ATTENDING COMMENTS
During my evaluation, patient's   was present and I appreciate that.    I interviewed the patient, discussed the case with the Resident and agree with the findings and plan as documented in the Resident's note except below.  Ms. Valiente is a 29 year old right handed woman with mild headache s/p pregnancy. During my evaluation, headache is much better. Non focal exam.  MRI Brain to my eye did not show any acute pathology.     Continue medical management, neuro- check and fall precaution.  GI and DVT prophylaxis.  I discussed the diagnosis and treatment plan with the patient.  All questions and concerns were addressed. The patient demonstrated good understanding of the treatment plan.  My cumulative time taking care of this patient is 80 minutes  If you have any further questions, please do not hesitate to contact our consult service.  Thank you for allowing us to participate in this patient care.

## 2024-08-02 NOTE — DISCHARGE NOTE OB - MEDICATION SUMMARY - MEDICATIONS TO TAKE
I will START or STAY ON the medications listed below when I get home from the hospital:    ibuprofen 600 mg oral tablet  -- 1 tab(s) by mouth every 6 hours  -- Indication: For for pain    acetaminophen 325 mg oral tablet  -- 3 tab(s) by mouth every 6 hours  -- Indication: For for pain    Prenatal 1 oral capsule  -- orally once a day  -- Indication: For for vitamins

## 2024-08-02 NOTE — CONSULT NOTE ADULT - ASSESSMENT
Pt is a 28 yo  three days post scheduled induction with history of GDM and self-reported high blood pressure and headache and floaters at home during pregnancy presenting with headache and blurry vision. The patient states that she received an epidural and had elevated blood pressure during her delivery and also had postpartum hemorrhage. MRI brain negative for acute pathologies.    Impression: Patient presenting with mild unilateral headache after delivery and epidural, improves with fioricet. Most likely tension headache in setting of lack of sleep and dehydration post delivery. Possible migraine due to unilateral headache with photophobia in patient with history of migraines, improving with fioricet, however severity too mild. Less likely low pressure headache since pain is nonpositional. Unlikely sinus venous thrombosis due to low severity, lack of focal deficits, and improvement with medication.     Recommendations:  [] Tylenol 650mg Q4 PRN for headache, would avoid discharging with fioricet due to risk of rebound headaches  [] No need for further imaging  [] If headache persists can followup with neurology outpatient at [address] [place] [address]    Patient seen and discussed with Dr. Rios.

## 2024-08-02 NOTE — PROGRESS NOTE ADULT - SUBJECTIVE AND OBJECTIVE BOX
OB Progress Note:  PPD#2    S: 28yo  PPD#2 s/p . Patient feels well. Pain is well controlled. She is tolerating a regular diet and passing flatus. She is voiding spontaneously, and ambulating without difficulty. Endorses light vaginal bleeding, soaking less than 1 pad/hour. Denies CP/SOB. Denies dizziness. Denies N/V. Endorses lightheadedness that has resolved.    Pt started on magnesium sulfate infusion o/n i/s/o blurry vision, headache, and hyperreflexia. Endorses improvement in headache.    O:  Vitals:   Vital Signs Last 24 Hrs  T(C): 36.6 (01 Aug 2024 06:21), Max: 37.1 (01 Aug 2024 01:43)  T(F): 97.8 (01 Aug 2024 06:21), Max: 98.7 (01 Aug 2024 01:43)  HR: 79 (01 Aug 2024 06:21) (70 - 88)  BP: 141/80 (01 Aug 2024 06:21) (105/74 - 141/80)  BP(mean): --  RR: 19 (01 Aug 2024 06:21) (17 - 19)  SpO2: 100% (01 Aug 2024 06:21) (97% - 100%)    Parameters below as of 01 Aug 2024 06:21  Patient On (Oxygen Delivery Method): room air        MEDICATIONS  (STANDING):  acetaminophen     Tablet .. 975 milliGRAM(s) Oral once  acetaminophen     Tablet .. 650 milliGRAM(s) Oral <User Schedule>  diphtheria/tetanus/pertussis (acellular) Vaccine (Adacel) 0.5 milliLiter(s) IntraMuscular once  ibuprofen  Tablet. 600 milliGRAM(s) Oral every 6 hours  lactated ringers. 1000 milliLiter(s) (50 mL/Hr) IV Continuous <Continuous>  magnesium sulfate Infusion 2 Gm/Hr (50 mL/Hr) IV Continuous <Continuous>  prenatal multivitamin 1 Tablet(s) Oral daily  sodium chloride 0.9% lock flush 3 milliLiter(s) IV Push every 8 hours    MEDICATIONS  (PRN):  acetaminophen 325 mG/butalbital 50 mG/caffeine 40 mG 1 Tablet(s) Oral every 6 hours PRN migraine  benzocaine 20%/menthol 0.5% Spray 1 Spray(s) Topical every 6 hours PRN for Perineal discomfort  dibucaine 1% Ointment 1 Application(s) Topical every 6 hours PRN Perineal discomfort  diphenhydrAMINE 25 milliGRAM(s) Oral every 6 hours PRN Pruritus  hydrocortisone 1% Cream 1 Application(s) Topical every 6 hours PRN Moderate Pain (4-6)  lanolin Ointment 1 Application(s) Topical every 6 hours PRN nipple soreness  magnesium hydroxide Suspension 30 milliLiter(s) Oral two times a day PRN Constipation  oxyCODONE    IR 5 milliGRAM(s) Oral once PRN Moderate to Severe Pain (4-10)  oxyCODONE    IR 5 milliGRAM(s) Oral every 3 hours PRN Moderate to Severe Pain (4-10)  pramoxine 1%/zinc 5% Cream 1 Application(s) Topical every 4 hours PRN Moderate Pain (4-6)  simethicone 80 milliGRAM(s) Chew every 4 hours PRN Gas  witch hazel Pads 1 Application(s) Topical every 4 hours PRN Perineal discomfort      Labs:  Blood type: O Negative  Rubella IgG: RPR: Negative                          8.3<L>   9.60 >-----------< 269    (  @ 06:03 )             25.2<L>                        9.3<L>   11.37<H> >-----------< 221    (  @ 06:30 )             27.8<L>                        11.1<L>   18.64<H> >-----------< 216    (  @ 05:34 )             33.4<L>                        11.9   13.89<H> >-----------< 250    (  @ 02:00 )             38.2    24 @ 06:30      135  |  109<H>  |  10  ----------------------------<  84  4.3   |  18<L>  |  0.85        Ca    8.3<L>      2024 06:30    TPro  5.0<L>  /  Alb  2.6<L>  /  TBili  <0.2  /  DBili  x   /  AST  27  /  ALT  11  /  AlkPhos  121<H>  24 @ 06:30          Physical Exam:  General: NAD  Heart: extremities well-perfused  Lungs: breathing comfortably  Abdomen: soft, non-tender, fundus firm  Extremities: 2+ pitting edema in b/l lower legs/feet    
POD #1 s/p vaginal delivery. Patient complaining of mild, consistent headache that is not altered by positional changes. Dr. Colón notified via phone, will go to bedside to further assess patient. Patient otherwise feeling well, OOBAA, tolerating a diet.  Linda Hoffman CRNA 
OB Progress Note:  PPD#1    S: 30yo  PPD#1 s/p . Patient feels well. Pain is well controlled. She is tolerating a regular diet and passing flatus. She is voiding spontaneously, and ambulating without difficulty. Endorses light vaginal bleeding, soaking less than 1 pad/hour. Denies CP/SOB. Denies lightheadedness/dizziness. Denies N/V.     O:  Vitals:  Vital Signs Last 24 Hrs  T(C): 36.7 (2024 22:13), Max: 37 (2024 07:34)  T(F): 98.1 (2024 22:13), Max: 98.6 (2024 07:34)  HR: 79 (:13) (75 - 117)  BP: 124/71 (2024 22:13) (108/74 - 127/87)  BP(mean): --  RR: 18 (:13) (18 - 20)  SpO2: 100% (2024 22:13) (89% - 100%)    Parameters below as of 2024 22:13  Patient On (Oxygen Delivery Method): room air        MEDICATIONS  (STANDING):  acetaminophen     Tablet .. 975 milliGRAM(s) Oral once  acetaminophen     Tablet .. 975 milliGRAM(s) Oral <User Schedule>  diphtheria/tetanus/pertussis (acellular) Vaccine (Adacel) 0.5 milliLiter(s) IntraMuscular once  ibuprofen  Tablet. 600 milliGRAM(s) Oral every 6 hours  prenatal multivitamin 1 Tablet(s) Oral daily  sodium chloride 0.9% lock flush 3 milliLiter(s) IV Push every 8 hours      Labs:  Blood type: O Negative  Rubella IgG: RPR: Negative                          11.1<L>   18.64<H> >-----------< 216    (  @ 05:34 )             33.4<L>                        11.9   13.89<H> >-----------< 250    (  @ 02:00 )             38.2                        12.6   9.57 >-----------< 216    (  @ 21:50 )             36.5                  Physical Exam:  General: NAD  Heart: all extremities well perfused  Lungs: breathing comfortably  Abdomen: soft, non-distended, fundus firm  Extremities: No calf tenderness to palpation, no lower leg erythema; 2+ pitting edema in lower legs/feet    
OB Progress Note:  PPD#3    S: 30yo PPD#3 s/p . Patient feels well. Pain is well controlled. She is tolerating a regular diet and passing flatus. She is voiding spontaneously, and ambulating without difficulty. Endorses light vaginal bleeding, soaking less than 1 pad/hour. Denies CP/SOB. Denies lightheadedness/dizziness. Denies N/V.  Pt continues to endorse headache, now 1-2/10. Denies scotoma/blurry vision/floaters/flashing lights.     O:  Vitals:   Vital Signs Last 24 Hrs  T(C): 36.6 (02 Aug 2024 00:01), Max: 36.8 (01 Aug 2024 12:30)  T(F): 97.9 (02 Aug 2024 00:01), Max: 98.2 (01 Aug 2024 12:30)  HR: 73 (02 Aug 2024 00:01) (73 - 88)  BP: 133/71 (02 Aug 2024 00:01) (119/72 - 141/80)  BP(mean): --  RR: 18 (02 Aug 2024 00:01) (16 - 19)  SpO2: 98% (02 Aug 2024 00:01) (98% - 100%)    Parameters below as of 02 Aug 2024 00:01  Patient On (Oxygen Delivery Method): room air        MEDICATIONS  (STANDING):  acetaminophen     Tablet .. 975 milliGRAM(s) Oral every 6 hours  diphtheria/tetanus/pertussis (acellular) Vaccine (Adacel) 0.5 milliLiter(s) IntraMuscular once  ibuprofen  Tablet. 600 milliGRAM(s) Oral every 6 hours  lactated ringers. 1000 milliLiter(s) (50 mL/Hr) IV Continuous <Continuous>  melatonin 3 milliGRAM(s) Oral at bedtime  prenatal multivitamin 1 Tablet(s) Oral daily  sodium chloride 0.9% lock flush 3 milliLiter(s) IV Push every 8 hours    MEDICATIONS  (PRN):  benzocaine 20%/menthol 0.5% Spray 1 Spray(s) Topical every 6 hours PRN for Perineal discomfort  dibucaine 1% Ointment 1 Application(s) Topical every 6 hours PRN Perineal discomfort  diphenhydrAMINE 25 milliGRAM(s) Oral every 6 hours PRN Pruritus  hydrocortisone 1% Cream 1 Application(s) Topical every 6 hours PRN Moderate Pain (4-6)  lanolin Ointment 1 Application(s) Topical every 6 hours PRN nipple soreness  magnesium hydroxide Suspension 30 milliLiter(s) Oral two times a day PRN Constipation  oxyCODONE    IR 5 milliGRAM(s) Oral once PRN Moderate to Severe Pain (4-10)  oxyCODONE    IR 5 milliGRAM(s) Oral every 3 hours PRN Moderate to Severe Pain (4-10)  pramoxine 1%/zinc 5% Cream 1 Application(s) Topical every 4 hours PRN Moderate Pain (4-6)  simethicone 80 milliGRAM(s) Chew every 4 hours PRN Gas  witch hazel Pads 1 Application(s) Topical every 4 hours PRN Perineal discomfort      Labs:  Blood type: O Negative  Rubella IgG: RPR: Negative                          8.3<L>   9.60 >-----------< 269    (  @ 06:03 )             25.2<L>                        9.3<L>   11.37<H> >-----------< 221    (  @ 06:30 )             27.8<L>                        11.1<L>   18.64<H> >-----------< 216    (  @ 05:34 )             33.4<L>    24 @ 06:03      138  |  107  |  9   ----------------------------<  89  3.9   |  22  |  0.71    24 @ 06:30      135  |  109<H>  |  10  ----------------------------<  84  4.3   |  18<L>  |  0.85        Ca    8.4      01 Aug 2024 06:03  Ca    8.3<L>      2024 06:30  Mg     6.00<H>       Mg     4.40<H>         TPro  5.2<L>  /  Alb  2.9<L>  /  TBili  <0.2  /  DBili  x   /  AST  20  /  ALT  12  /  AlkPhos  112  24 @ 06:03  TPro  5.0<L>  /  Alb  2.6<L>  /  TBili  <0.2  /  DBili  x   /  AST  27  /  ALT  11  /  AlkPhos  121<H>  24 @ 06:30          Physical Exam:  General: NAD  Heart: extremities well-perfused  Lungs: breathing comfortably  Abdomen: soft, non-tender, non-distended, fundus firm  Extremities: 1+ pitting edema in LE

## 2024-08-02 NOTE — PROGRESS NOTE ADULT - ASSESSMENT
A/P: 30yo  PPD#3 s/p .  Patient is stable and feels well post-partum.  #sPEC  - Pt met criteria o/n due to HA/blurry vision/hyperreflexia.  - s/p Tyl/reglan/benadryl x1, s/p Esgic x1  - Mg sulfate (-)   - HELLP labs wnl  - C/w BP monitoring    #PPH      - QBL: 1000+130 (Atony)      - Hct: 36.5->38.2->33.4->27.8      - Methergine, Hemabate, RC, Felicia ( @130a)     #  - Pain well controlled, continue current pain regimen  - Continue ambulation  - Requested SCD machine, RN to place  - Continue regular diet  - Discharge planning     Jake Zamarripa PGY1   A/P: 28yo  PPD#3 s/p .  Patient is stable and feels well post-partum.  #sPEC  - Pt met criteria due to HA/blurry vision/hyperreflexia.  - s/p Tyl/reglan/benadryl x1, s/p Esgic x1  - s/p Mg sulfate (-)   - HELLP labs wnl  - C/w BP monitoring  - Monitor HA symptoms  - CT head (): no acute ICH, hypodensity in L Parieto-occipital region; recommend f/u MR   - MR brain ordered  - Neuro consult    #PPH      - QBL: 1000+130 (Atony)      - Hct: 36.5->38.2->33.4->27.8-25.2      - Methergine, Hemabate, ROAXNNE Felicia ( @130a)   - F/u AM CBC    #  - Pain well controlled, continue current pain regimen  - Continue ambulation  - Continue regular diet  - Discharge planning     Jake Zamarripa PGY1   A/P: 30yo  PPD#3 s/p .  Patient is stable and feels well post-partum.  #sPEC  - Pt met criteria due to HA/blurry vision/hyperreflexia.  - s/p Tyl/reglan/benadryl x1, s/p Esgic x1  - s/p Mg sulfate (-)   - HELLP labs wnl  - C/w BP monitoring  - Monitor HA symptoms  - CT head (): no acute ICH, hypodensity in L Parieto-occipital region; recommend f/u MR   - MR brain ordered  - Neuro consult    #PPH      - QBL: 1000+130 (Atony)      - Hct: 36.5->38.2->33.4->27.8-25.2      - Methergine, Hemabate, Lance OLIVEIRAda ( @130a)   - F/u AM CBC    #  - Pain well controlled, continue current pain regimen  - Continue ambulation  - Continue regular diet  - Requested RN to place SCDs for continued LE edema  - Discharge planning     Jake Zamarripa PGY1

## 2024-08-02 NOTE — DISCHARGE NOTE OB - PLAN OF CARE
please check blood pressure three times   please call if BP > 140/90, headache, vision changes, right upper quadrant or epigastric pain nothing in the vagina x 6 weeks

## 2024-08-02 NOTE — PROGRESS NOTE ADULT - ATTENDING COMMENTS
Attending Note     PPD 3 s/p  c/w pre-x  pt reports headache improved  neurology saw pt and no further inpatient w/u recommended  MRI normal   will d/c home today with close follow up   discharge instructins reviewed  f/u next week in office     ASHLEY aPt MD

## 2024-08-02 NOTE — CONSULT NOTE ADULT - SUBJECTIVE AND OBJECTIVE BOX
Neurology - Consult Note    Spectra: [phone] ([facility]), [phone] ([facility])    HPI:  HPI: Pt is a 28 yo  @39.5wks, DARON: 24, presenting as a scheduled induction of labor secondary to GDM A2. NPH 20 units a qHS. Patient endorses well controlled FS. Endorses positive fetal movement. Denies LOF/VB/CTX.  GBS neg  EFW: 3700g  Prenatal Issues: GDMA2  Ob: W4W      OBHx: G1 current   Gyn Hx: denies   PMH: denies  SHx: bilateral hip arthroscopy for torn labrum   Psych: denies   Social: denies   Medications: PNV, bASA, Humulin 20u qhs   Allergies: NKDA   Will Accept blood transfusion? yes              (2024 20:59)      Review of Systems:   CONSTITUTIONAL: No fevers or chills  EYES AND ENT: No visual changes or no throat pain   NECK: No pain or stiffness  RESPIRATORY: No shortness of breath  CARDIOVASCULAR: No chest pain or palpitations  GASTROINTESTINAL: No nausea or vomiting   GENITOURINARY: No dysuria  NEUROLOGICAL: +As stated in HPI above  SKIN: No itching, burning, rashes, or lesions   All other review of systems is negative unless indicated above.    Allergies:  shellfish (Other)  No Known Drug Allergies      PMHx/PSHx/Family Hx: As above, otherwise see below   No pertinent past medical history    GDM (gestational diabetes mellitus)        Social Hx:  Never smoker; no current use of tobacco, alcohol, or illicit drugs  Lives with ***; occupation ***, baseline functional status is ***    Medications:  MEDICATIONS  (STANDING):  acetaminophen     Tablet .. 975 milliGRAM(s) Oral every 6 hours  diphtheria/tetanus/pertussis (acellular) Vaccine (Adacel) 0.5 milliLiter(s) IntraMuscular once  ibuprofen  Tablet. 600 milliGRAM(s) Oral every 6 hours  lactated ringers. 1000 milliLiter(s) (50 mL/Hr) IV Continuous <Continuous>  melatonin 3 milliGRAM(s) Oral at bedtime  prenatal multivitamin 1 Tablet(s) Oral daily  sodium chloride 0.9% lock flush 3 milliLiter(s) IV Push every 8 hours    MEDICATIONS  (PRN):  benzocaine 20%/menthol 0.5% Spray 1 Spray(s) Topical every 6 hours PRN for Perineal discomfort  dibucaine 1% Ointment 1 Application(s) Topical every 6 hours PRN Perineal discomfort  diphenhydrAMINE 25 milliGRAM(s) Oral every 6 hours PRN Pruritus  hydrocortisone 1% Cream 1 Application(s) Topical every 6 hours PRN Moderate Pain (4-6)  lanolin Ointment 1 Application(s) Topical every 6 hours PRN nipple soreness  magnesium hydroxide Suspension 30 milliLiter(s) Oral two times a day PRN Constipation  oxyCODONE    IR 5 milliGRAM(s) Oral every 3 hours PRN Moderate to Severe Pain (4-10)  oxyCODONE    IR 5 milliGRAM(s) Oral once PRN Moderate to Severe Pain (4-10)  pramoxine 1%/zinc 5% Cream 1 Application(s) Topical every 4 hours PRN Moderate Pain (4-6)  simethicone 80 milliGRAM(s) Chew every 4 hours PRN Gas  witch hazel Pads 1 Application(s) Topical every 4 hours PRN Perineal discomfort      Vitals:  T(C): 36.8 (24 @ 06:39), Max: 36.8 (24 @ 12:30)  HR: 75 (24 @ 06:39) (73 - 83)  BP: 137/88 (24 @ 06:39) (119/72 - 137/88)  RR: 18 (24 @ 06:39) (16 - 18)  SpO2: 98% (24 @ 06:39) (98% - 100%)    Physical Examination:  General - non-toxic appearing male/female in no acute distress  Cardiovascular - peripheral pulses palpable, no edema  Respiratory - breathing comfortably with no increased work of breathing    Neurologic Exam:  Mental status - Awake, Alert, Oriented to person, place, and time. Speech fluent, repetition and naming intact. Follows simple and complex commands. Attention/concentration, recent and remote memory (including registration 3/3 and recall 3/3), and fund of knowledge intact    Cranial nerves - PERRLA (4mm -> 3mm b/l), VFF, EOMI, face sensation (V1-V3) intact b/l, facial strength intact without asymmetry b/l, hearing intact b/l, palate with symmetric elevation, trapezius OR sternocleidomastiod 5/5 strength b/l, tongue midline on protrusion with full lateral movement    Motor - Normal bulk and tone throughout. No pronator drift.    Strength testing                              Right           Left  Should-Abduct       5                   5  Elbow-Flex             5                   5  Elbow-Ext              5                   5  Wrist-Flex              5                   5  Wrist-Ext               5                   5  Interossei              5                   5                        5                   5    Hip-Flex                 5                   5  Hip-Ext                  5                   5  Knee-Flex              5                   5  Knee-Ext                5                   5  Dorsiflex                5                   5  Plantarflex             5                   5  Inversion               5                   5  Eversion                5                   5    Sensation - Light touch intact throughout    Reflexes:                                             Right             Left  Triceps                     2+                2+  Biceps                      2+                2+  Brachioradialis          2+                2+  Patellar                    2+                 2+  Achilles                    2+                 2+  Plantar                   Down            Down    Coordination - Finger to Nose intact b/l. No tremors appreciated    Gait and station - Normal casual gait. Romberg (-)    Labs:                        9.2    8.52  )-----------( 338      ( 02 Aug 2024 06:20 )             28.1     08-02    139  |  106  |  7   ----------------------------<  87  3.9   |  23  |  0.73    Ca    7.3<L>      02 Aug 2024 06:20  Mg     6.00     08-01    TPro  5.7<L>  /  Alb  3.2<L>  /  TBili  <0.2  /  DBili  x   /  AST  21  /  ALT  16  /  AlkPhos  116  08-02    CAPILLARY BLOOD GLUCOSE        LIVER FUNCTIONS - ( 02 Aug 2024 06:20 )  Alb: 3.2 g/dL / Pro: 5.7 g/dL / ALK PHOS: 116 U/L / ALT: 16 U/L / AST: 21 U/L / GGT: x             PT/INR - ( 02 Aug 2024 06:20 )   PT: 8.6 sec;   INR: <0.90 ratio         PTT - ( 02 Aug 2024 06:20 )  PTT:28.0 sec  CSF:                  Radiology:  MR Head w/wo IV Cont:  (02 Aug 2024 08:34)  CT Head No Cont:  (01 Aug 2024 23:20)     Neurology - Consult Note    Spectra: [phone] ([facility]), [phone] ([facility])    HPI: Pt is a 28 yo  three days post scheduled induction with history of GDM and self-reported high blood pressure and headache and floaters at home during pregnancy presenting with headache and blurry vision. The patient states that she received an epidural and had elevated blood pressure during her delivery and also had postpartum hemorrhage. The patient states she has a light-sensitive dull headache in the front of her forehead bilaterally and in her right eye that is constant and does not change with position that began after delivery. She rates the severity of the headache as a 3/10 and states that the headache has never been more than a 4 in terms of severity.  She also states that she has had intermittent blurry vision although the blurry vision is not currently present. She denies nausea/vomiting, phonophobia, neck pain or rigidity, diplopia. She denies history of hypercoagulability in herself or in her family. She has had less sleep over the past three days and has had increased caffeine intake. She has taken Tylenol and Motrin and was on Mg Sulfate which was stopped this morning. She states that Fioricet has helped with her headaches.     OBHx: G1 current   Gyn Hx: denies   PMH: denies  SHx: bilateral hip arthroscopy for torn labrum   Psych: denies   Social: denies   Medications: PNV, bASA, Humulin 20u qhs   Allergies: NKDA   Will Accept blood transfusion? yes              (2024 20:59)      Review of Systems:   CONSTITUTIONAL: No fevers or chills  EYES AND ENT: No visual changes or no throat pain   NECK: No pain or stiffness  RESPIRATORY: No shortness of breath  CARDIOVASCULAR: No chest pain or palpitations  GASTROINTESTINAL: No nausea or vomiting   GENITOURINARY: No dysuria  NEUROLOGICAL: +As stated in HPI above  SKIN: No itching, burning, rashes, or lesions   All other review of systems is negative unless indicated above.    Allergies:  shellfish (Other)  No Known Drug Allergies      PMHx/PSHx/Family Hx: As above, otherwise see below   No pertinent past medical history    GDM (gestational diabetes mellitus)        Social Hx:  Never smoker; no current use of tobacco, alcohol, or illicit drugs  Lives with ***; occupation ***, baseline functional status is ***    Medications:  MEDICATIONS  (STANDING):  acetaminophen     Tablet .. 975 milliGRAM(s) Oral every 6 hours  diphtheria/tetanus/pertussis (acellular) Vaccine (Adacel) 0.5 milliLiter(s) IntraMuscular once  ibuprofen  Tablet. 600 milliGRAM(s) Oral every 6 hours  lactated ringers. 1000 milliLiter(s) (50 mL/Hr) IV Continuous <Continuous>  melatonin 3 milliGRAM(s) Oral at bedtime  prenatal multivitamin 1 Tablet(s) Oral daily  sodium chloride 0.9% lock flush 3 milliLiter(s) IV Push every 8 hours    MEDICATIONS  (PRN):  benzocaine 20%/menthol 0.5% Spray 1 Spray(s) Topical every 6 hours PRN for Perineal discomfort  dibucaine 1% Ointment 1 Application(s) Topical every 6 hours PRN Perineal discomfort  diphenhydrAMINE 25 milliGRAM(s) Oral every 6 hours PRN Pruritus  hydrocortisone 1% Cream 1 Application(s) Topical every 6 hours PRN Moderate Pain (4-6)  lanolin Ointment 1 Application(s) Topical every 6 hours PRN nipple soreness  magnesium hydroxide Suspension 30 milliLiter(s) Oral two times a day PRN Constipation  oxyCODONE    IR 5 milliGRAM(s) Oral every 3 hours PRN Moderate to Severe Pain (4-10)  oxyCODONE    IR 5 milliGRAM(s) Oral once PRN Moderate to Severe Pain (4-10)  pramoxine 1%/zinc 5% Cream 1 Application(s) Topical every 4 hours PRN Moderate Pain (4-6)  simethicone 80 milliGRAM(s) Chew every 4 hours PRN Gas  witch hazel Pads 1 Application(s) Topical every 4 hours PRN Perineal discomfort      Vitals:  T(C): 36.8 (24 @ 06:39), Max: 36.8 (24 @ 12:30)  HR: 75 (24 @ 06:39) (73 - 83)  BP: 137/88 (24 @ 06:39) (119/72 - 137/88)  RR: 18 (24 @ 06:39) (16 - 18)  SpO2: 98% (24 @ 06:39) (98% - 100%)    Physical Examination:  General - non-toxic appearing male/female in no acute distress  Cardiovascular - peripheral pulses palpable, no edema  Respiratory - breathing comfortably with no increased work of breathing    Neurologic Exam:  Mental status - Awake, Alert, Oriented to person, place, and time. Speech fluent, repetition and naming intact. Follows simple and complex commands. Attention/concentration, recent and remote memory (including registration 3/3 and recall 3/3), and fund of knowledge intact    Cranial nerves - PERRLA (4mm -> 3mm b/l), VFF, EOMI, face sensation (V1-V3) intact b/l, facial strength intact without asymmetry b/l, hearing intact b/l, palate with symmetric elevation, trapezius OR sternocleidomastiod 5/5 strength b/l, tongue midline on protrusion with full lateral movement    Motor - Normal bulk and tone throughout. No pronator drift.    Strength testing                              Right           Left  Should-Abduct       5                   5  Elbow-Flex             5                   5  Elbow-Ext              5                   5  Wrist-Flex              5                   5  Wrist-Ext               5                   5  Interossei              5                   5                        5                   5    Hip-Flex                 5                   5  Hip-Ext                  5                   5  Knee-Flex              5                   5  Knee-Ext                5                   5  Dorsiflex                5                   5  Plantarflex             5                   5  Inversion               5                   5  Eversion                5                   5    Sensation - Light touch intact throughout    Reflexes:                                             Right             Left  Triceps                     2+                2+  Biceps                      2+                2+  Brachioradialis          2+                2+  Patellar                    2+                 2+  Achilles                    2+                 2+  Plantar                   Down            Down    Coordination - Finger to Nose intact b/l. No tremors appreciated    Gait and station - Normal casual gait. Romberg (-)    Labs:                        9.2    8.52  )-----------( 338      ( 02 Aug 2024 06:20 )             28.1     -    139  |  106  |  7   ----------------------------<  87  3.9   |  23  |  0.73    Ca    7.3<L>      02 Aug 2024 06:20  Mg     6.00     08-    TPro  5.7<L>  /  Alb  3.2<L>  /  TBili  <0.2  /  DBili  x   /  AST  21  /  ALT  16  /  AlkPhos  116  08-    CAPILLARY BLOOD GLUCOSE        LIVER FUNCTIONS - ( 02 Aug 2024 06:20 )  Alb: 3.2 g/dL / Pro: 5.7 g/dL / ALK PHOS: 116 U/L / ALT: 16 U/L / AST: 21 U/L / GGT: x             PT/INR - ( 02 Aug 2024 06:20 )   PT: 8.6 sec;   INR: <0.90 ratio         PTT - ( 02 Aug 2024 06:20 )  PTT:28.0 sec  CSF:                  Radiology:  MR Head w/wo IV Cont:  (02 Aug 2024 08:34)  CT Head No Cont:  (01 Aug 2024 23:20)

## 2024-08-02 NOTE — DISCHARGE NOTE OB - CARE PLAN
Principal Discharge DX:	Normal vaginal delivery  Assessment and plan of treatment:	nothing in the vagina x 6 weeks  Secondary Diagnosis:	Pre-eclampsia, third trimester  Assessment and plan of treatment:	please check blood pressure three times   please call if BP > 140/90, headache, vision changes, right upper quadrant or epigastric pain   1

## 2024-08-02 NOTE — DISCHARGE NOTE OB - CARE PROVIDER_API CALL
Lily Ash  Obstetrics and Gynecology  [address], [address]  [place] [address]  Phone: [phone]  Fax: [phone]  Follow Up Time:

## 2024-08-02 NOTE — DISCHARGE NOTE OB - MEDICATION SUMMARY - MEDICATIONS TO CHANGE
Wound Care: Bacitracin X Depth Of Punch In Cm (Optional): 0 Billing Type: Third-Party Bill Epidermal Sutures: 4-0 Nylon Anesthesia Type: 1% lidocaine with epinephrine and a 1:10 solution of 8.4% sodium bicarbonate Bill 09820 For Specimen Handling/Conveyance To Laboratory?: no Post-Care Instructions: I reviewed with the patient in detail post-care instructions. Patient is to keep the biopsy site dry overnight, and then apply bacitracin twice daily until healed. Biopsy Type: H and E Punch Size In Mm: 3 Consent: Written consent was obtained and risks were reviewed including but not limited to scarring, infection, bleeding, scabbing, incomplete removal, nerve damage and allergy to anesthesia. Suture Removal: 14 days Was A Bandage Applied: Yes Hemostasis: None Home Suture Removal Text: Patient was provided a home suture removal kit and will remove their sutures at home.  If they have any questions or difficulties they will call the office. Notification Instructions: Patient will be notified of biopsy results. However, patient instructed to call the office if not contacted within 2 weeks. Dressing: bandage Anesthesia Volume In Cc (Will Not Render If 0): 0.5 Detail Level: Detailed I will SWITCH the dose or number of times a day I take the medications listed below when I get home from the hospital:  None

## 2024-08-02 NOTE — DISCHARGE NOTE OB - PATIENT PORTAL LINK FT
You can access the FollowMyHealth Patient Portal offered by [facility] by registering at the following website: [email or web address]. By joining [employer]’s FollowMyHealth portal, you will also be able to view your health information using other applications (apps) compatible with our system.

## 2024-08-02 NOTE — DISCHARGE NOTE OB - NS MD DC FALL RISK RISK
For information on Fall & Injury Prevention, visit: [email or web address] OR  [email or web address] OR  https://www.cdc.gov/steadi/patient.html

## 2024-08-05 ENCOUNTER — NON-APPOINTMENT (OUTPATIENT)
Age: 29
End: 2024-08-05

## 2024-08-06 ENCOUNTER — NON-APPOINTMENT (OUTPATIENT)
Age: 29
End: 2024-08-06

## 2024-08-07 ENCOUNTER — NON-APPOINTMENT (OUTPATIENT)
Age: 29
End: 2024-08-07

## 2024-08-09 ENCOUNTER — APPOINTMENT (OUTPATIENT)
Dept: CARDIOLOGY | Facility: CLINIC | Age: 29
End: 2024-08-09

## 2024-08-09 ENCOUNTER — NON-APPOINTMENT (OUTPATIENT)
Age: 29
End: 2024-08-09

## 2024-08-09 PROBLEM — R06.09 DOE (DYSPNEA ON EXERTION): Status: ACTIVE | Noted: 2024-08-09

## 2024-08-09 PROCEDURE — 99204 OFFICE O/P NEW MOD 45 MIN: CPT | Mod: 25

## 2024-08-09 PROCEDURE — 93000 ELECTROCARDIOGRAM COMPLETE: CPT

## 2024-08-09 NOTE — PHYSICAL EXAM
[Well Developed] : well developed [Well Nourished] : well nourished [No Acute Distress] : no acute distress [Normal Conjunctiva] : normal conjunctiva [Normal Venous Pressure] : normal venous pressure [No Carotid Bruit] : no carotid bruit [Normal S1, S2] : normal S1, S2 [No Murmur] : no murmur [No Rub] : no rub [No Gallop] : no gallop [Clear Lung Fields] : clear lung fields [Good Air Entry] : good air entry [No Respiratory Distress] : no respiratory distress  [Soft] : abdomen soft [Non Tender] : non-tender [Normal Gait] : normal gait [No Edema] : no edema [No Cyanosis] : no cyanosis [No Skin Lesions] : no skin lesions [No Rash] : no rash [Moves all extremities] : moves all extremities [No Focal Deficits] : no focal deficits [Normal Speech] : normal speech [Alert and Oriented] : alert and oriented [Normal memory] : normal memory

## 2024-08-12 ENCOUNTER — APPOINTMENT (OUTPATIENT)
Dept: CARDIOLOGY | Facility: CLINIC | Age: 29
End: 2024-08-12
Payer: COMMERCIAL

## 2024-08-12 PROCEDURE — 93306 TTE W/DOPPLER COMPLETE: CPT

## 2024-08-13 NOTE — DISCUSSION/SUMMARY
[FreeTextEntry1] : Appears euvolemic on exam BP not significantly elevated Will check TTE to evaluate for evidence of peripartum cardiomyopathy; overall suspicion low heart healthy lifestyle discussed with pt [EKG obtained to assist in diagnosis and management of assessed problem(s)] : EKG obtained to assist in diagnosis and management of assessed problem(s)

## 2024-08-13 NOTE — HISTORY OF PRESENT ILLNESS
[FreeTextEntry1] : PCP: Dr. Odette Padilla OB: Dr. Yelitza De La Rosa  29F who presents for cardiac evaluation.  s/p birth 7/30/24 c/b PIH, ppPEC currently feels well, with some fatigue and LO which is hard to discern if due to being new parent or if there is a cardiac component  BP cuff at home - 140s/90s  Pregnancy hx: One dtr - GDM, PIH, ppPEC, vaginal, EBL 1000mL, 7/30/24  Fam hx: Mother - arrhythmia No premature CAD or SCD No hx of miscarriages or blood clots

## 2024-08-13 NOTE — REVIEW OF SYSTEMS
[SOB] : shortness of breath [Dyspnea on exertion] : dyspnea during exertion [Negative] : Heme/Lymph [Lower Ext Edema] : lower extremity edema [Chest Discomfort] : no chest discomfort [Leg Claudication] : no intermittent leg claudication [Palpitations] : no palpitations [Orthopnea] : no orthopnea [PND] : no PND [Syncope] : no syncope

## 2025-03-27 NOTE — OB RN TRIAGE NOTE - NS_BABIESUTERO_OBGYN_ALL_OB_NU
HBO Treatment Course Details      Treatment Course Number: 32  Total Treatments Ordered:  40     Diagnosis:   1. Diabetic ulcer of toe of right foot associated with type 2 diabetes mellitus, with necrosis of bone (HCC)  Hyperbaric oxygen thearpy    Wound Procedure Treatment Diabetic Ulcer Right;Posterior Toe D1, great          HBO Treatment Details:  In-Patient Visit: no  Treatment Length:90 Minutes(Minutes)  Chamber #: Hard sided Monoplace Chamber    Pre-Treatment details:  Pre-treatment protocol Treatment Protocol: 2.0 INDY X 90 minutes w/ 100% oxygen, two 5 minute air breaks  Left ear clear?: yes  Right ear clear?: yes  Left ear intact?: yes  Right ear intact?: yes  Left ear color: translucent  Right ear color: translucent  PE Tubes present, Left ear?: yes  PE Tubes present, Right ear?: yes  Left ear irrigated?: no  Right ear irrigated?: no  Left ear TEED scale: Grade 0  Right ear TEED scale: Grade 0   Pretreatment heart and lung assessment: Pretreatment heart and lung auscltation unremarkable. Patient cleared for HBOT     Treatment details:  INDY Rate: 2  Started Compression: 1104  Reached Compression: 1111  Total Compression Time: 7 (Minutes)  Total Holding Time: 100 (Minutes)  Started Decompression: 1251  Reached Surface: 1258  Total Decompression Time: 7 (Minutes)  Total Airbreaks:   (Minutes)  Total Time of Treatment: 114 (Minutes)  Symptoms Noted During Treatment: None (Minutes)    Post treatment details:  Left ear clear?: yes  Right ear clear?: yes  Left ears intact?: yes  Right ears intact?: yes  Left ear color: translucent  Right ear color: translucent  PE Tubes present, Left ear?: yes  PE Tubes present, Right ear?: yes        Left ear TEED scale: Grade 0  Right ear TEED scale: Grade 0  Post treatment heart and lung assessment: Post treatment heart and lung auscltation unremarkable. Patient cleared for discharge. Tolerated treatment well.             Vital Signs:  HBO Glucose Reference Range: 100-350 mg/dl    Pre-Treatment Post-Treatment   Time vitals are taken: 1100 Time vitals are taken: 1305   Blood Pressure: 104/68 Blood Pressure: 104/64   Pulse: 60 Pulse: 72   Resp: 18 Resp: 18   Temp: 96.7 °F (35.9 °C) Temp: 97.7 °F (36.5 °C)   Pre-Inspection Glucose readin Post-Inspection Glucose reading: (!) 117       Allergies   Allergen Reactions   • Lisinopril Rash and Lip Swelling     Patient Active Problem List    Diagnosis Date Noted   • Constipation 2025   • Gram-positive bacteremia 2025   • Acute metabolic encephalopathy 2025   • Bacteremia 2025   • S/P placement of cardiac pacemaker 2024   • Bradycardia 2024   • Multiple open wounds of lower extremity 2024   • Ulcer of right foot (HCC) 2024   • SOB (shortness of breath) 2024   • Nausea 2024   • Elevated troponin 2024   • History of DVT (deep vein thrombosis) 2024   • Diabetic ulcer of left foot associated with type 2 diabetes mellitus, with muscle involvement without evidence of necrosis ([facility]) 2024   • Plantar fasciitis, right 07/10/2024   • Acute deep vein thrombosis (DVT) of left peroneal vein ([facility]) 2024   • Iron deficiency anemia 2024   • Shortness of breath 2024   • History of colon polyps 2024   • Duodenal ulcer 2024   • Multiple gastric ulcers 2023   • Iron deficiency anemia due to chronic blood loss 2023   • Melena 2023   • Symptomatic anemia 2023   • Frequent PVCs 2023   • Chronic diastolic congestive heart failure ([facility]) 2023   • Acute kidney injury superimposed on stage 3b chronic kidney disease ([facility]) 2023   • Diabetic ulcer of right midfoot associated with diabetes mellitus due to underlying condition, limited to breakdown of skin ([facility]) 06/15/2023   • Hypertensive urgency 2023   • Elevated troponin level not due myocardial infarction 2023   • Stable angina pectoris ([facility]) 2023   • Chronic  kidney disease-mineral and bone disorder 11/30/2022   • Nonrheumatic aortic valve stenosis 11/11/2022   • Gross hematuria 07/26/2022   • Platelets decreased ([facility]) 07/22/2022   • Acute kidney injury superimposed on chronic kidney disease  ([facility]) 02/16/2022   • Actinic keratoses 01/14/2022   • Type 2 diabetes mellitus with diabetic peripheral angiopathy without gangrene, with long-term current use of insulin ([facility]) 01/11/2022   • Varicose veins of left lower extremity 06/25/2021   • History of endovascular stent graft for abdominal aortic aneurysm (AAA) 06/25/2021   • Bruit (arterial) 06/25/2021   • PAD (peripheral artery disease) ([facility]) 06/25/2021   • Type 2 diabetes mellitus with diabetic polyneuropathy, with long-term current use of insulin ([facility]) 06/10/2021   • Mixed hyperlipidemia 05/11/2021   • Primary hypertension 05/11/2021   • Coronary artery disease involving native coronary artery of native heart without angina pectoris 05/11/2021   • S/P angioplasty with stent 05/11/2021   • Hx of CABG 05/11/2021   • S/P AAA repair 05/11/2021   • S/P prostatectomy 05/11/2021   • H/O prostate cancer 05/11/2021     Orders Placed This Encounter   Procedures   • Wound Procedure Treatment Diabetic Ulcer Right;Posterior Toe D1, great     This order was created via procedure documentation   Wound Procedure Treatment Diabetic Ulcer Right;Posterior Toe D1, great    Performed by: Mendy Roldan RN  Authorized by: David Frye MD  Associated wounds:   Wound 11/08/24 Diabetic Ulcer Toe D1, great Right;Posterior    Wound cleansed with:  NSS   Applied topical:  Woun'Dres   Applied secondary dressing:  Gauze   Dressing secured with:  Ender and Tape   Offloading device appllied:  Surgical shoe        1